# Patient Record
Sex: FEMALE | Race: WHITE | NOT HISPANIC OR LATINO | Employment: OTHER | URBAN - NONMETROPOLITAN AREA
[De-identification: names, ages, dates, MRNs, and addresses within clinical notes are randomized per-mention and may not be internally consistent; named-entity substitution may affect disease eponyms.]

---

## 2017-01-03 ENCOUNTER — HOSPITAL ENCOUNTER (OUTPATIENT)
Dept: EDUCATION SERVICES | Facility: HOSPITAL | Age: 57
Discharge: HOME | End: 2017-01-03
Attending: NURSE PRACTITIONER | Admitting: FAMILY MEDICINE
Payer: COMMERCIAL

## 2017-01-03 VITALS
HEART RATE: 63 BPM | DIASTOLIC BLOOD PRESSURE: 76 MMHG | SYSTOLIC BLOOD PRESSURE: 134 MMHG | HEIGHT: 62 IN | OXYGEN SATURATION: 97 % | WEIGHT: 241.3 LBS | BODY MASS INDEX: 44.4 KG/M2

## 2017-01-03 DIAGNOSIS — E11.65 TYPE 2 DIABETES MELLITUS WITH HYPERGLYCEMIA, WITHOUT LONG-TERM CURRENT USE OF INSULIN (H): ICD-10-CM

## 2017-01-03 DIAGNOSIS — E11.9 TYPE 2 DIABETES MELLITUS WITHOUT COMPLICATION, WITHOUT LONG-TERM CURRENT USE OF INSULIN (H): Primary | ICD-10-CM

## 2017-01-03 PROCEDURE — 97803 MED NUTRITION INDIV SUBSEQ: CPT | Performed by: DIETITIAN, REGISTERED

## 2017-01-03 ASSESSMENT — PAIN SCALES - GENERAL: PAINLEVEL: MODERATE PAIN (4)

## 2017-01-03 NOTE — PROGRESS NOTES
"Pt here for Session 4.     BG readings as follows:   [id number]-  Post meal-100, 125  Overall average-113      Blood pressure 134/76, pulse 63, height 1.575 m (5' 2\"), weight 109.453 kg (241 lb 4.8 oz), SpO2 97 %, not currently breastfeeding.  Weight is down 8.6# from last visit and down 21.3# from initial visit 9-20-16.       Current diabetes medications: none.     Readiness to learn: willing.    Barriers to learning: none.     Pt continues to do very well with meal planning efforts - even losing weight over the holidays.  She is unable to exercise r/t back issues but has still been able to lose weight.      Lipid levels have shown some improvement - Tg-181 from 306, HDL-49 from 43, LDL-157 from 161.  Pt is not taking lipid lowering medications and will see provider again in 3 months to determine if medications indicated.     Pt actively participated in the session and continues to demonstrate motivation.     Topics covered: diabetes success plan, behavior change goal review, meal planning and importance of long term compliance, developing problem solving skills, stress and how it affects blood sugar, relationship between diabetes and depression along with symptoms of depression and how they affect diabetes self-care. Rationale for consistent self-care and regular diabetes care visits, identifying medical tests/exams needed for regular diabetes care and community resources for ongoing education and support.     Pts A1c tested 11-29-16 6.3% improved from 7.1% 8-23-16.    PHQ-9 completed with score of 4.    Pt goals updated. Weight loss continues to be a goal for pt but she has not set a number goal.      Pt will continue current efforts.      Will follow annually and prn.      Total visit time: 15 minutes.     Jazmyn Loo, LORENZA, CDE      "

## 2018-09-08 DIAGNOSIS — E11.65 TYPE 2 DIABETES MELLITUS WITH HYPERGLYCEMIA, WITHOUT LONG-TERM CURRENT USE OF INSULIN (H): ICD-10-CM

## 2018-12-03 ENCOUNTER — TRANSFERRED RECORDS (OUTPATIENT)
Dept: HEALTH INFORMATION MANAGEMENT | Facility: CLINIC | Age: 58
End: 2018-12-03

## 2019-01-02 ENCOUNTER — TRANSFERRED RECORDS (OUTPATIENT)
Dept: HEALTH INFORMATION MANAGEMENT | Facility: CLINIC | Age: 59
End: 2019-01-02

## 2019-07-29 ENCOUNTER — TRANSFERRED RECORDS (OUTPATIENT)
Dept: HEALTH INFORMATION MANAGEMENT | Facility: CLINIC | Age: 59
End: 2019-07-29

## 2019-07-29 LAB
HBA1C MFR BLD: 7 % (ref 4–5.6)
TSH SERPL-ACNC: 0.47 UIU/ML (ref 0.4–3.99)

## 2019-07-31 ENCOUNTER — TRANSFERRED RECORDS (OUTPATIENT)
Dept: HEALTH INFORMATION MANAGEMENT | Facility: CLINIC | Age: 59
End: 2019-07-31

## 2019-09-06 PROBLEM — K21.9 GASTROESOPHAGEAL REFLUX DISEASE: Status: ACTIVE | Noted: 2019-09-06

## 2019-09-06 NOTE — PROGRESS NOTES
Subjective     Mary Jane Stack is a 59 year old female who presents to clinic today for the following health issues:    HPI   New Patient/Transfer of Care  Diabetes Follow-up      How often are you checking your blood sugar? Not at all    What time of day are you checking your blood sugars- not currently checking    Have you had any blood sugars above 200?  unknown    Have you had any blood sugars below 70?  unknown    What symptoms do you notice when your blood sugar is low?  None    What concerns do you have today about your diabetes? None     Do you have any of these symptoms? (Select all that apply)  No numbness or tingling in feet.  No redness, sores or blisters on feet.  No complaints of excessive thirst.  No reports of blurry vision.  No significant changes to weight.     Have you had a diabetic eye exam in the last 12 months? Yes- Date of last eye exam: 08/2019    Diabetes Management Resources    A1C: 07/29/2019: 7.0   5.9 on 04/06/2018      She is diet controlled.    Hyperlipidemia Follow-Up      Are you having any of the following symptoms? (Select all that apply)  No complaints of shortness of breath, chest pain or pressure.  No increased sweating or nausea with activity.  No left-sided neck or arm pain.  No complaints of pain in calves when walking 1-2 blocks.    Are you regularly taking any medication or supplement to lower your cholesterol?   Yes- lipitor    Are you having muscle aches or other side effects that you think could be caused by your cholesterol lowering medication?  No    Hypertension Follow-up      Do you check your blood pressure regularly outside of the clinic? No     Are you following a low salt diet? No    Are your blood pressures ever more than 140 on the top number (systolic) OR more   than 90 on the bottom number (diastolic), for example 140/90? No    BP Readings from Last 2 Encounters:   09/12/19 124/80   01/03/17 134/76     No results found for: A1C, LDL      How many servings of  fruits and vegetables do you eat daily?  2-3    On average, how many sweetened beverages do you drink each day (soda, juice, sweet tea, etc)?   0    How many days per week do you miss taking your medication? 0        Hypothyroidism Follow-up      Since last visit, patient describes the following symptoms: Weight stable, no hair loss, no skin changes, no constipation, no loose stools    TSH: 07/29/2019:  0.47          Patient Active Problem List   Diagnosis     ACP (advance care planning)     Actinic keratosis     Atypical nevus     Degeneration of intervertebral disc of lumbosacral region     Essential hypertension     Excessive and frequent menstruation     Gastroesophageal reflux disease     Hyperlipidemia     Hypothyroidism     Impaired fasting glucose     Insomnia     Low back pain     Morbid (severe) obesity due to excess calories (H)     Opioid dependence (H)     Psoriasis     Type 2 diabetes mellitus (H)     Long term current use of non-steroidal anti-inflammatories (NSAID)     Diabetes mellitus, type 2 (H)     Esophageal reflux     Hypertension     Past Surgical History:   Procedure Laterality Date     APPENDECTOMY       BACK SURGERY  2007     CARPAL TUNNEL RELEASE RT/LT  1999     DECOMPRESS DISC RF LUMBAR      3 levels     DILATION AND CURETTAGE       FUSION LUMBAR ANTERIOR THREE+ LEVELS       GALLBLADDER SURGERY  1987     HERNIA REPAIR  2007     TUBAL LIGATION  1987       Social History     Tobacco Use     Smoking status: Former Smoker     Types: Cigarettes     Smokeless tobacco: Never Used   Substance Use Topics     Alcohol use: No     Family History   Problem Relation Age of Onset     Diabetes Mother      Cancer Mother      Migraines Mother      Obesity Mother      Diabetes Maternal Grandfather      Hypertension Father      Heart Disease Father            -------------------------------------  Reviewed and updated as needed this visit by Provider         Review of Systems   ROS COMP: CONSTITUTIONAL:  NEGATIVE for fever, chills  CONSTITUTIONAL:weight gain due to inactivity   EYES: NEGATIVE for vision changes or irritation  RESP: NEGATIVE for significant cough or SOB  CV: NEGATIVE for chest pain, palpitations or peripheral edema  GI: NEGATIVE for nausea, abdominal pain, heartburn, or change in bowel habits  : NEGATIVE for frequency, dysuria, or hematuria  MUSCULOSKELETAL:POSITIVE  for arthralgias of the right knee.  She has a known torn meniscus.  She does not get any catching or clicking.  She has medial knee pain and back pain due to Lumbar DDD  NEURO: radicular pain posterior right leg  ENDOCRINE: NEGATIVE for temperature intolerance, skin/hair changes  PSYCHIATRIC: NEGATIVE for changes in mood or affect      Objective    /80 (BP Location: Right arm, Patient Position: Sitting, Cuff Size: Adult Large)   Pulse 100   Temp 98.3  F (36.8  C) (Tympanic)   Ht 1.524 m (5')   Wt 115.7 kg (255 lb)   SpO2 97%   BMI 49.80 kg/m    Body mass index is 49.8 kg/m .  Physical Exam   GENERAL: healthy, alert and no distress  EYES: Eyes grossly normal to inspection and conjunctivae and sclerae normal  NECK: no adenopathy, no asymmetry, masses, or scars and thyroid normal to palpation  NECK: no carotid bruits  RESP: lungs clear to auscultation - no rales, rhonchi or wheezes  CV: regular rate and rhythm, normal S1 S2, no S3 or S4, no murmur, click or rub, no peripheral edema and peripheral pulses strong  ABDOMEN: soft, nontender, no hepatosplenomegaly, no masses and bowel sounds normal  ABDOMEN: no bruits heard  MS: no gross musculoskeletal defects noted, no edema  NEURO: Normal strength and tone, mentation intact and speech normal  PSYCH: mentation appears normal, affect normal/bright    Diagnostic Test Results:  Labs reviewed in Epic  Results for orders placed or performed in visit on 09/12/19   Lipid Profile (Chol, Trig, HDL, LDL calc)   Result Value Ref Range    Cholesterol 179 <200 mg/dL    Triglycerides 202 (H)  <150 mg/dL    HDL Cholesterol 57 >49 mg/dL    LDL Cholesterol Calculated 82 <100 mg/dL    Non HDL Cholesterol 122 <130 mg/dL   Albumin Random Urine Quantitative with Creat Ratio   Result Value Ref Range    Creatinine Urine 515 mg/dL    Albumin Urine mg/L 115 mg/L    Albumin Urine mg/g Cr 22.33 0 - 25 mg/g Cr   T4 free   Result Value Ref Range    T4 Free 1.13 0.76 - 1.46 ng/dL   Hepatitis C antibody   Result Value Ref Range    Hepatitis C Antibody Nonreactive NR^Nonreactive                   ASSESSMENT /PLAN:    (E11.9) Type 2 diabetes mellitus without complication, without long-term current use of insulin (H)  Comment: At goal.  Her blood pressure is at goal and her renal function is normal.  She is on both an ACE inhibitor and a STATIN  Plan:   She will continue to monitor her portion sizes.    (I10) Essential hypertension  Comment: AT goal.  Plan:   She will continue Lisinopril 10 mg daily.    (E78.2) Mixed hyperlipidemia  (primary encounter diagnosis)  Comment: Her LDL is below 100  With a high HDL.  Plan:   She will continue Lipitor 40 mg for now and she may want to consider Crestor.     (E03.9) Acquired hypothyroidism  Comment: The patient's thyroid hormone levels are normal indicating a therapeutic medication level.  Plan:   She will continue Levothyroxine 50 mcg daily.    (K21.9) Gastroesophageal reflux disease, esophagitis presence not specified  Comment:   Plan:   She will continue Omeprazole 20 mg BID.    (Z11.59) Need for hepatitis C screening test  Comment: Hep C antibody is negative.  Plan:  No further work up is needed.    (Z23) Need for vaccination  Comment:   Plan:   SHINGRIX [17685], 1st  Administration  [02086]           Follow up with Provider - 2 months for diabetes with foot exam.           Anoop Brooks, , DO

## 2019-09-12 ENCOUNTER — OFFICE VISIT (OUTPATIENT)
Dept: INTERNAL MEDICINE | Facility: OTHER | Age: 59
End: 2019-09-12
Attending: INTERNAL MEDICINE
Payer: COMMERCIAL

## 2019-09-12 VITALS
WEIGHT: 255 LBS | BODY MASS INDEX: 50.06 KG/M2 | TEMPERATURE: 98.3 F | HEART RATE: 100 BPM | SYSTOLIC BLOOD PRESSURE: 124 MMHG | OXYGEN SATURATION: 97 % | DIASTOLIC BLOOD PRESSURE: 80 MMHG | HEIGHT: 60 IN

## 2019-09-12 DIAGNOSIS — Z11.59 NEED FOR HEPATITIS C SCREENING TEST: ICD-10-CM

## 2019-09-12 DIAGNOSIS — E78.2 MIXED HYPERLIPIDEMIA: Primary | ICD-10-CM

## 2019-09-12 DIAGNOSIS — K21.9 GASTROESOPHAGEAL REFLUX DISEASE, ESOPHAGITIS PRESENCE NOT SPECIFIED: ICD-10-CM

## 2019-09-12 DIAGNOSIS — E03.9 ACQUIRED HYPOTHYROIDISM: ICD-10-CM

## 2019-09-12 DIAGNOSIS — I10 ESSENTIAL HYPERTENSION: ICD-10-CM

## 2019-09-12 DIAGNOSIS — Z23 NEED FOR VACCINATION: ICD-10-CM

## 2019-09-12 DIAGNOSIS — E11.9 TYPE 2 DIABETES MELLITUS WITHOUT COMPLICATION, WITHOUT LONG-TERM CURRENT USE OF INSULIN (H): ICD-10-CM

## 2019-09-12 LAB
CHOLEST SERPL-MCNC: 179 MG/DL
CREAT UR-MCNC: 515 MG/DL
HDLC SERPL-MCNC: 57 MG/DL
LDLC SERPL CALC-MCNC: 82 MG/DL
MICROALBUMIN UR-MCNC: 115 MG/L
MICROALBUMIN/CREAT UR: 22.33 MG/G CR (ref 0–25)
NONHDLC SERPL-MCNC: 122 MG/DL
T4 FREE SERPL-MCNC: 1.13 NG/DL (ref 0.76–1.46)
TRIGL SERPL-MCNC: 202 MG/DL

## 2019-09-12 PROCEDURE — 99204 OFFICE O/P NEW MOD 45 MIN: CPT | Performed by: INTERNAL MEDICINE

## 2019-09-12 PROCEDURE — 86803 HEPATITIS C AB TEST: CPT | Mod: ZL | Performed by: INTERNAL MEDICINE

## 2019-09-12 PROCEDURE — 36415 COLL VENOUS BLD VENIPUNCTURE: CPT | Mod: ZL | Performed by: INTERNAL MEDICINE

## 2019-09-12 PROCEDURE — 82043 UR ALBUMIN QUANTITATIVE: CPT | Mod: ZL | Performed by: INTERNAL MEDICINE

## 2019-09-12 PROCEDURE — G0463 HOSPITAL OUTPT CLINIC VISIT: HCPCS

## 2019-09-12 PROCEDURE — G0463 HOSPITAL OUTPT CLINIC VISIT: HCPCS | Mod: 25

## 2019-09-12 PROCEDURE — 90471 IMMUNIZATION ADMIN: CPT | Mod: GY | Performed by: INTERNAL MEDICINE

## 2019-09-12 PROCEDURE — 84439 ASSAY OF FREE THYROXINE: CPT | Mod: ZL | Performed by: INTERNAL MEDICINE

## 2019-09-12 PROCEDURE — 90750 HZV VACC RECOMBINANT IM: CPT

## 2019-09-12 PROCEDURE — 80061 LIPID PANEL: CPT | Mod: ZL | Performed by: INTERNAL MEDICINE

## 2019-09-12 RX ORDER — ATORVASTATIN CALCIUM 40 MG/1
TABLET, FILM COATED ORAL
COMMUNITY
Start: 2019-06-28

## 2019-09-12 RX ORDER — LISINOPRIL 10 MG/1
TABLET ORAL
COMMUNITY
Start: 2019-08-16

## 2019-09-12 RX ORDER — CELECOXIB 200 MG/1
200 CAPSULE ORAL 2 TIMES DAILY
Refills: 0 | COMMUNITY
Start: 2019-08-23

## 2019-09-12 RX ORDER — LEVOTHYROXINE SODIUM 50 UG/1
TABLET ORAL
COMMUNITY
Start: 2019-08-20 | End: 2019-09-12 | Stop reason: DRUGHIGH

## 2019-09-12 RX ORDER — LEVOTHYROXINE SODIUM 50 UG/1
50 TABLET ORAL DAILY
Qty: 90 TABLET | Refills: 1 | COMMUNITY
Start: 2019-09-12

## 2019-09-12 ASSESSMENT — PAIN SCALES - GENERAL: PAINLEVEL: MODERATE PAIN (4)

## 2019-09-12 ASSESSMENT — ANXIETY QUESTIONNAIRES
2. NOT BEING ABLE TO STOP OR CONTROL WORRYING: NOT AT ALL
4. TROUBLE RELAXING: NOT AT ALL
GAD7 TOTAL SCORE: 1
5. BEING SO RESTLESS THAT IT IS HARD TO SIT STILL: NOT AT ALL
6. BECOMING EASILY ANNOYED OR IRRITABLE: SEVERAL DAYS
7. FEELING AFRAID AS IF SOMETHING AWFUL MIGHT HAPPEN: NOT AT ALL
1. FEELING NERVOUS, ANXIOUS, OR ON EDGE: NOT AT ALL
3. WORRYING TOO MUCH ABOUT DIFFERENT THINGS: NOT AT ALL

## 2019-09-12 ASSESSMENT — PATIENT HEALTH QUESTIONNAIRE - PHQ9: SUM OF ALL RESPONSES TO PHQ QUESTIONS 1-9: 4

## 2019-09-12 ASSESSMENT — MIFFLIN-ST. JEOR: SCORE: 1658.17

## 2019-09-12 NOTE — NURSING NOTE
Chief Complaint   Patient presents with     Establish Care       Initial /80 (BP Location: Right arm, Patient Position: Sitting, Cuff Size: Adult Large)   Pulse 100   Temp 98.3  F (36.8  C) (Tympanic)   Ht 1.524 m (5')   Wt 115.7 kg (255 lb)   SpO2 97%   BMI 49.80 kg/m   Estimated body mass index is 49.8 kg/m  as calculated from the following:    Height as of this encounter: 1.524 m (5').    Weight as of this encounter: 115.7 kg (255 lb).  Medication Reconciliation: complete   Piedad Mckay LPN

## 2019-09-13 ASSESSMENT — ANXIETY QUESTIONNAIRES: GAD7 TOTAL SCORE: 1

## 2019-09-16 LAB — HCV AB SERPL QL IA: NONREACTIVE

## 2019-09-27 ENCOUNTER — TRANSFERRED RECORDS (OUTPATIENT)
Dept: HEALTH INFORMATION MANAGEMENT | Facility: CLINIC | Age: 59
End: 2019-09-27

## 2020-10-05 DIAGNOSIS — E11.9 TYPE 2 DIABETES MELLITUS WITHOUT COMPLICATION, WITHOUT LONG-TERM CURRENT USE OF INSULIN (H): Primary | ICD-10-CM

## 2020-10-06 RX ORDER — BLOOD SUGAR DIAGNOSTIC
STRIP MISCELLANEOUS
Qty: 100 STRIP | Refills: 11 | Status: SHIPPED | OUTPATIENT
Start: 2020-10-06

## 2020-10-06 NOTE — TELEPHONE ENCOUNTER
BG one touch ultra test strips       Last Written Prescription Date:  9/12/19  Last Fill Quantity: 100,   # refills: 11  Last Office Visit: 9/12/19  Future Office visit:       Routing refill request to provider for review/approval because:

## 2021-05-28 ENCOUNTER — HOSPITAL ENCOUNTER (EMERGENCY)
Facility: HOSPITAL | Age: 61
Discharge: HOME | End: 2021-05-28
Attending: EMERGENCY MEDICINE | Admitting: EMERGENCY MEDICINE
Payer: COMMERCIAL

## 2021-05-28 VITALS
SYSTOLIC BLOOD PRESSURE: 132 MMHG | OXYGEN SATURATION: 97 % | TEMPERATURE: 97.4 F | DIASTOLIC BLOOD PRESSURE: 88 MMHG | HEART RATE: 64 BPM | RESPIRATION RATE: 11 BRPM

## 2021-05-28 DIAGNOSIS — R73.9 HYPERGLYCEMIA: ICD-10-CM

## 2021-05-28 LAB
ALBUMIN UR-MCNC: NEGATIVE MG/DL
ANION GAP SERPL CALCULATED.3IONS-SCNC: 7 MMOL/L (ref 3–14)
APPEARANCE UR: CLEAR
BASE EXCESS BLDV CALC-SCNC: 1.7 MMOL/L
BASOPHILS # BLD AUTO: 0.1 10E9/L (ref 0–0.2)
BASOPHILS NFR BLD AUTO: 0.8 %
BILIRUB UR QL STRIP: NEGATIVE
BUN SERPL-MCNC: 12 MG/DL (ref 7–30)
CALCIUM SERPL-MCNC: 9.4 MG/DL (ref 8.5–10.1)
CHLORIDE SERPL-SCNC: 102 MMOL/L (ref 94–109)
CO2 SERPL-SCNC: 28 MMOL/L (ref 20–32)
COLOR UR AUTO: YELLOW
CREAT SERPL-MCNC: 0.6 MG/DL (ref 0.52–1.04)
DIFFERENTIAL METHOD BLD: ABNORMAL
EOSINOPHIL # BLD AUTO: 0.2 10E9/L (ref 0–0.7)
EOSINOPHIL NFR BLD AUTO: 3.2 %
ERYTHROCYTE [DISTWIDTH] IN BLOOD BY AUTOMATED COUNT: 11.9 % (ref 10–15)
EST. AVERAGE GLUCOSE BLD GHB EST-MCNC: 249 MG/DL
GFR SERPL CREATININE-BSD FRML MDRD: >90 ML/MIN/{1.73_M2}
GLUCOSE BLDC GLUCOMTR-MCNC: 300 MG/DL (ref 70–99)
GLUCOSE BLDC GLUCOMTR-MCNC: 334 MG/DL (ref 70–99)
GLUCOSE SERPL-MCNC: 328 MG/DL (ref 70–99)
GLUCOSE UR STRIP-MCNC: >1000 MG/DL
HBA1C MFR BLD: 10.3 % (ref 0–5.6)
HCO3 BLDV-SCNC: 28 MMOL/L (ref 21–28)
HCT VFR BLD AUTO: 47.4 % (ref 35–47)
HGB BLD-MCNC: 16.6 G/DL (ref 11.7–15.7)
HGB UR QL STRIP: NEGATIVE
IMM GRANULOCYTES # BLD: 0 10E9/L (ref 0–0.4)
IMM GRANULOCYTES NFR BLD: 0.3 %
KETONES BLD-SCNC: 0.3 MMOL/L (ref 0–0.6)
KETONES UR STRIP-MCNC: NEGATIVE MG/DL
LEUKOCYTE ESTERASE UR QL STRIP: NEGATIVE
LYMPHOCYTES # BLD AUTO: 1.8 10E9/L (ref 0.8–5.3)
LYMPHOCYTES NFR BLD AUTO: 25.7 %
MAGNESIUM SERPL-MCNC: 1.7 MG/DL (ref 1.6–2.3)
MCH RBC QN AUTO: 29.2 PG (ref 26.5–33)
MCHC RBC AUTO-ENTMCNC: 35 G/DL (ref 31.5–36.5)
MCV RBC AUTO: 83 FL (ref 78–100)
MONOCYTES # BLD AUTO: 0.4 10E9/L (ref 0–1.3)
MONOCYTES NFR BLD AUTO: 5.9 %
NEUTROPHILS # BLD AUTO: 4.5 10E9/L (ref 1.6–8.3)
NEUTROPHILS NFR BLD AUTO: 64.1 %
NITRATE UR QL: NEGATIVE
NRBC # BLD AUTO: 0 10*3/UL
NRBC BLD AUTO-RTO: 0 /100
O2/TOTAL GAS SETTING VFR VENT: 97 %
PCO2 BLDV: 51 MM HG (ref 40–50)
PH BLDV: 7.36 PH (ref 7.32–7.43)
PH UR STRIP: 5 PH (ref 4.7–8)
PHOSPHATE SERPL-MCNC: 3.4 MG/DL (ref 2.5–4.5)
PLATELET # BLD AUTO: 303 10E9/L (ref 150–450)
PO2 BLDV: 29 MM HG (ref 25–47)
POTASSIUM SERPL-SCNC: 3.7 MMOL/L (ref 3.4–5.3)
RBC # BLD AUTO: 5.69 10E12/L (ref 3.8–5.2)
SODIUM SERPL-SCNC: 137 MMOL/L (ref 133–144)
SOURCE: ABNORMAL
SP GR UR STRIP: 1.03 (ref 1–1.03)
UROBILINOGEN UR STRIP-MCNC: NORMAL MG/DL (ref 0–2)
WBC # BLD AUTO: 7.1 10E9/L (ref 4–11)

## 2021-05-28 PROCEDURE — 36415 COLL VENOUS BLD VENIPUNCTURE: CPT

## 2021-05-28 PROCEDURE — 84100 ASSAY OF PHOSPHORUS: CPT | Performed by: EMERGENCY MEDICINE

## 2021-05-28 PROCEDURE — 83036 HEMOGLOBIN GLYCOSYLATED A1C: CPT | Performed by: EMERGENCY MEDICINE

## 2021-05-28 PROCEDURE — 99284 EMERGENCY DEPT VISIT MOD MDM: CPT | Mod: 25

## 2021-05-28 PROCEDURE — 80048 BASIC METABOLIC PNL TOTAL CA: CPT | Performed by: EMERGENCY MEDICINE

## 2021-05-28 PROCEDURE — 82803 BLOOD GASES ANY COMBINATION: CPT | Performed by: EMERGENCY MEDICINE

## 2021-05-28 PROCEDURE — 258N000002 HC RX IP 258 OP 250: Performed by: EMERGENCY MEDICINE

## 2021-05-28 PROCEDURE — 999N001182 HC STATISTIC ESTIMATED AVERAGE GLUCOSE: Performed by: EMERGENCY MEDICINE

## 2021-05-28 PROCEDURE — 250N000012 HC RX MED GY IP 250 OP 636 PS 637: Performed by: EMERGENCY MEDICINE

## 2021-05-28 PROCEDURE — 82010 KETONE BODYS QUAN: CPT | Performed by: EMERGENCY MEDICINE

## 2021-05-28 PROCEDURE — 96374 THER/PROPH/DIAG INJ IV PUSH: CPT

## 2021-05-28 PROCEDURE — 85025 COMPLETE CBC W/AUTO DIFF WBC: CPT | Performed by: EMERGENCY MEDICINE

## 2021-05-28 PROCEDURE — 99284 EMERGENCY DEPT VISIT MOD MDM: CPT | Performed by: EMERGENCY MEDICINE

## 2021-05-28 PROCEDURE — 999N001017 HC STATISTIC GLUCOSE BY METER IP

## 2021-05-28 PROCEDURE — 258N000003 HC RX IP 258 OP 636: Performed by: EMERGENCY MEDICINE

## 2021-05-28 PROCEDURE — 83735 ASSAY OF MAGNESIUM: CPT | Performed by: EMERGENCY MEDICINE

## 2021-05-28 PROCEDURE — 96361 HYDRATE IV INFUSION ADD-ON: CPT

## 2021-05-28 PROCEDURE — 81003 URINALYSIS AUTO W/O SCOPE: CPT | Performed by: EMERGENCY MEDICINE

## 2021-05-28 RX ORDER — OXYCODONE AND ACETAMINOPHEN 10; 325 MG/1; MG/1
1 TABLET ORAL EVERY 6 HOURS PRN
COMMUNITY

## 2021-05-28 RX ORDER — SODIUM CHLORIDE 450 MG/100ML
1000 INJECTION, SOLUTION INTRAVENOUS CONTINUOUS
Status: DISCONTINUED | OUTPATIENT
Start: 2021-05-28 | End: 2021-05-28 | Stop reason: HOSPADM

## 2021-05-28 RX ADMIN — SODIUM CHLORIDE 1000 ML: 4.5 INJECTION, SOLUTION INTRAVENOUS at 17:28

## 2021-05-28 RX ADMIN — INSULIN HUMAN 6 UNITS: 100 INJECTION, SOLUTION PARENTERAL at 16:35

## 2021-05-28 RX ADMIN — SODIUM CHLORIDE 1000 ML: 9 INJECTION, SOLUTION INTRAVENOUS at 16:35

## 2021-05-28 ASSESSMENT — ENCOUNTER SYMPTOMS
CARDIOVASCULAR NEGATIVE: 1
GASTROINTESTINAL NEGATIVE: 1
CONSTITUTIONAL NEGATIVE: 1
POLYDIPSIA: 0
RESPIRATORY NEGATIVE: 1
EYES NEGATIVE: 1
NEUROLOGICAL NEGATIVE: 1
MUSCULOSKELETAL NEGATIVE: 1

## 2021-05-28 NOTE — ED NOTES
"Patient ambulated to room. Patient reports feeling \"off\" and that she checked her glucose last night and today and it was in the mid 300 range. Patient stated she was unsure of a number point in which she should be evaluated at. Patient reports increased thirst and urination for a while now. Patient denies any pain. Patient stated she has not needed any medication at this point to manage her glucose levels. Patient denies any additional needs at this time.   "

## 2021-05-28 NOTE — ED PROVIDER NOTES
"  History     Chief Complaint   Patient presents with     Hyperglycemia     HPI  Mary Jane Stack is a 61 year old female who comes to the emergency department complaining of elevated blood sugar.  She states that she is a diet-controlled diabetic.  She states she is not checked her sugar in quite some time but when she checked it today was elevated.  She states that she feels \"not quite right\".  She was recently treated for a yeast infection.  She wonders if she may not have a urinary tract infection.  She is not noticed hematuria or dysuria.  She denies a headache or visual disturbance.  She states is not having pain in her chest currently.  She denies feeling short of breath.  She has had no nausea or vomiting.  She has not had diarrhea or constipation.  She denies fevers or chills.    Allergies:  Allergies   Allergen Reactions     Vancomycin Shortness Of Breath     Penicillin G Rash       Problem List:    Patient Active Problem List    Diagnosis Date Noted     Diabetes mellitus, type 2 (H)      Priority: Medium     Esophageal reflux      Priority: Medium     Hypertension      Priority: Medium     Gastroesophageal reflux disease 09/06/2019     Priority: Medium     Overview:   IMO Update 10/11       ACP (advance care planning) 09/20/2016     Priority: Medium     Advance Care Planning 9/20/2016: ACP Review of Chart / Resources Provided:  Reviewed chart for advance care plan.  Mary Jane Stack has no plan or code status on file. Discussed available resources and provided with information. Confirmed code status reflects current choices pending further ACP discussions.  Confirmed/documented legally designated decision makers.  Added by Alyssa Brewster           Type 2 diabetes mellitus (H) 08/23/2016     Priority: Medium     Opioid dependence (H) 03/25/2016     Priority: Medium     Hyperlipidemia 04/01/2015     Priority: Medium     Morbid (severe) obesity due to excess calories (H) 04/10/2012     Priority: Medium     Long " term current use of non-steroidal anti-inflammatories (NSAID) 03/20/2012     Priority: Medium     Actinic keratosis 03/06/2012     Priority: Medium     Atypical nevus 03/06/2012     Priority: Medium     Overview:   IMO Update 10/11  IMO Update       Hypothyroidism 07/25/2011     Priority: Medium     Overview:   IMO Update 10/11       Impaired fasting glucose 01/20/2011     Priority: Medium     Overview:   IMO Update 10/11       Insomnia 12/06/2006     Priority: Medium     Degeneration of intervertebral disc of lumbosacral region 10/10/2006     Priority: Medium     Overview:   IMO Update 10/11       Excessive and frequent menstruation 10/10/2006     Priority: Medium     Overview:   IMO Update 10/11       Low back pain 10/10/2006     Priority: Medium     Overview:   IMO Update 10/11       Psoriasis 10/10/2006     Priority: Medium     Essential hypertension 09/12/2006     Priority: Medium     Overview:   IMO Update          Past Medical History:    Past Medical History:   Diagnosis Date     Diabetes mellitus, type 2 (H)      Esophageal reflux      Gall bladder disease      Hypertension      Migraines      Thyroid disease      Uncomplicated asthma        Past Surgical History:    Past Surgical History:   Procedure Laterality Date     APPENDECTOMY       BACK SURGERY  2007     CARPAL TUNNEL RELEASE RT/LT  1999     COLONOSCOPY - HIM SCAN  01/27/2005    Diverticulosis and hemorrhoids, family history, repeat 5 yrs     DECOMPRESS DISC RF LUMBAR      3 levels     DILATION AND CURETTAGE       FUSION LUMBAR ANTERIOR THREE+ LEVELS       GALLBLADDER SURGERY  1987     HERNIA REPAIR  2007     TUBAL LIGATION  1987       Family History:    Family History   Problem Relation Age of Onset     Diabetes Mother      Cancer Mother      Migraines Mother      Obesity Mother      Diabetes Maternal Grandfather      Hypertension Father      Heart Disease Father        Social History:  Marital Status:   [4]  Social History     Tobacco Use      Smoking status: Former Smoker     Types: Cigarettes     Smokeless tobacco: Never Used   Substance Use Topics     Alcohol use: No     Drug use: No        Medications:    ACETAMINOPHEN PO  ASPIRIN PO  atorvastatin (LIPITOR) 40 MG tablet  celecoxib (CELEBREX) 200 MG capsule  levothyroxine (SYNTHROID/LEVOTHROID) 50 MCG tablet  lisinopril (PRINIVIL/ZESTRIL) 10 MG tablet  metFORMIN (GLUCOPHAGE) 500 MG tablet  omeprazole (PRILOSEC) 2 mg/mL  oxyCODONE-acetaminophen (PERCOCET)  MG per tablet  Tolterodine Tartrate (DETROL LA PO)  vitamin  B complex with vitamin C (VITAMIN  B COMPLEX) TABS  albuterol (PROAIR HFA, PROVENTIL HFA, VENTOLIN HFA) 108 (90 BASE) MCG/ACT inhaler  blood glucose monitoring (ONE TOUCH DELICA) lancets  Eszopiclone (LUNESTA PO)  ONETOUCH ULTRA test strip  OxyCODONE HCl (OXYCONTIN PO)          Review of Systems   Constitutional: Negative.    HENT: Negative.    Eyes: Negative.    Respiratory: Negative.    Cardiovascular: Negative.    Gastrointestinal: Negative.    Endocrine: Negative for polydipsia and polyuria.   Genitourinary: Negative.    Musculoskeletal: Negative.    Neurological: Negative.    All other systems reviewed and are found unremarkable    Physical Exam   BP: (!) 148/101  Pulse: 83  Temp: 97.4  F (36.3  C)  Resp: 18  SpO2: 99 %      Physical Exam this is a 61-year-old female who is awake alert oriented person place and time.  She is very pleasant and cooperative with my exam.  She does not appear in acute distress at this time.  HEENT normocephalic extraocular muscles intact pupils equally round and reactive to light oropharynx is unremarkable.  Neck is supple his range of motion without pain or evidence of nuchal irritation.  Lungs are clear bilaterally.  Heart maintains a regular rate and rhythm S1 and S2 sounds are appreciated.  The abdomen is soft and nontender no mass no organomegaly no rebound.  Extremities have full range of motion 5/5 strength no edema.  Neurologic exam no  focal cranial nerve deficit is appreciated.  Dermatologic exam there are no diffuse skin rashes or lesions.    ED Course      The patient remained very stable throughout her stay in the emergency department.  She tolerated IV hydration and regular insulin administration quite well.  Her blood sugar began to come down.  I discussed the need for starting an oral hypoglycemic.  Patient is agreeable.  She will be discharged with appropriate discharge instructions.  I will advise her to be reassessed by her primary care provider soon as possible.  I will also urged her to return immediately to the emergency department if further problems should occur.                     Results for orders placed or performed during the hospital encounter of 05/28/21 (from the past 24 hour(s))   Glucose by meter   Result Value Ref Range    Glucose 334 (H) 70 - 99 mg/dL   Hemoglobin A1c   Result Value Ref Range    Hemoglobin A1C 10.3 (H) 0 - 5.6 %   CBC with platelets differential   Result Value Ref Range    WBC 7.1 4.0 - 11.0 10e9/L    RBC Count 5.69 (H) 3.8 - 5.2 10e12/L    Hemoglobin 16.6 (H) 11.7 - 15.7 g/dL    Hematocrit 47.4 (H) 35.0 - 47.0 %    MCV 83 78 - 100 fl    MCH 29.2 26.5 - 33.0 pg    MCHC 35.0 31.5 - 36.5 g/dL    RDW 11.9 10.0 - 15.0 %    Platelet Count 303 150 - 450 10e9/L    Diff Method Automated Method     % Neutrophils 64.1 %    % Lymphocytes 25.7 %    % Monocytes 5.9 %    % Eosinophils 3.2 %    % Basophils 0.8 %    % Immature Granulocytes 0.3 %    Nucleated RBCs 0 0 /100    Absolute Neutrophil 4.5 1.6 - 8.3 10e9/L    Absolute Lymphocytes 1.8 0.8 - 5.3 10e9/L    Absolute Monocytes 0.4 0.0 - 1.3 10e9/L    Absolute Eosinophils 0.2 0.0 - 0.7 10e9/L    Absolute Basophils 0.1 0.0 - 0.2 10e9/L    Abs Immature Granulocytes 0.0 0 - 0.4 10e9/L    Absolute Nucleated RBC 0.0    Basic metabolic panel   Result Value Ref Range    Sodium 137 133 - 144 mmol/L    Potassium 3.7 3.4 - 5.3 mmol/L    Chloride 102 94 - 109 mmol/L    Carbon  Dioxide 28 20 - 32 mmol/L    Anion Gap 7 3 - 14 mmol/L    Glucose 328 (H) 70 - 99 mg/dL    Urea Nitrogen 12 7 - 30 mg/dL    Creatinine 0.60 0.52 - 1.04 mg/dL    GFR Estimate >90 >60 mL/min/[1.73_m2]    GFR Estimate If Black >90 >60 mL/min/[1.73_m2]    Calcium 9.4 8.5 - 10.1 mg/dL   Phosphorus   Result Value Ref Range    Phosphorus 3.4 2.5 - 4.5 mg/dL   Magnesium   Result Value Ref Range    Magnesium 1.7 1.6 - 2.3 mg/dL   Blood gas venous   Result Value Ref Range    Ph Venous 7.36 7.32 - 7.43 pH    PCO2 Venous 51 (H) 40 - 50 mm Hg    PO2 Venous 29 25 - 47 mm Hg    Bicarbonate Venous 28 21 - 28 mmol/L    Base Excess Venous 1.7 mmol/L    FIO2 97    Ketone Beta-Hydroxybutyrate Quantitative   Result Value Ref Range    Ketone Quantitative 0.3 0.0 - 0.6 mmol/L   Estimated Average Glucose   Result Value Ref Range    Estimated Average Glucose 249 mg/dL   Glucose by meter   Result Value Ref Range    Glucose 300 (H) 70 - 99 mg/dL   UA reflex to Microscopic   Result Value Ref Range    Color Urine Yellow     Appearance Urine Clear     Glucose Urine >1000 (A) NEG^Negative mg/dL    Bilirubin Urine Negative NEG^Negative    Ketones Urine Negative NEG^Negative mg/dL    Specific Gravity Urine 1.033 1.003 - 1.035    Blood Urine Negative NEG^Negative    pH Urine 5.0 4.7 - 8.0 pH    Protein Albumin Urine Negative NEG^Negative mg/dL    Urobilinogen mg/dL Normal 0.0 - 2.0 mg/dL    Nitrite Urine Negative NEG^Negative    Leukocyte Esterase Urine Negative NEG^Negative    Source Midstream Urine        Medications   0.9% sodium chloride BOLUS (0 mLs Intravenous Stopped 5/28/21 1727)     Followed by   0.45% sodium chloride infusion (1,000 mLs Intravenous New Bag 5/28/21 1728)   insulin (regular) (HumuLIN R/NovoLIN R) injection 6 Units (6 Units Intravenous Given 5/28/21 1635)       Assessments & Plan (with Medical Decision Making)     I have reviewed the nursing notes.    I have reviewed the findings, diagnosis, plan and need for follow up with  the patient.  Plan for this patient will be discharged with appropriate prescriptions discharge instructions and instructions for follow-up.    New Prescriptions    METFORMIN (GLUCOPHAGE) 500 MG TABLET    Take 1 tablet (500 mg) by mouth 2 times daily (with meals)       Final diagnoses:   Hyperglycemia     History of diet-controlled diabetes.  5/28/2021   [facility]     Devan Flowers DO  05/28/21 1552

## 2021-05-28 NOTE — ED TRIAGE NOTES
Patient presemnts with complaints of higher blood sugars. States she is diabetic, but has never been on anything to help lower her sugars other then diet. She states she has not checked her sugars in quite some time and states previously she was in there 100's.

## 2021-06-22 ENCOUNTER — MEDICAL CORRESPONDENCE (OUTPATIENT)
Dept: HEALTH INFORMATION MANAGEMENT | Facility: CLINIC | Age: 61
End: 2021-06-22

## 2021-07-16 ENCOUNTER — TELEPHONE (OUTPATIENT)
Dept: SURGERY | Facility: OTHER | Age: 61
End: 2021-07-16

## 2021-07-16 NOTE — TELEPHONE ENCOUNTER
Referral received for colonoscopy for screening for colon cancer.   This patient was not approved by [facility] surgery education RN for meet and greet colonoscopy without preop appointment.   First attempt to call patient to schedule. No answer. Leona Angela LPN

## 2021-08-13 ENCOUNTER — TELEPHONE (OUTPATIENT)
Dept: SURGERY | Facility: OTHER | Age: 61
End: 2021-08-13

## 2021-08-13 NOTE — TELEPHONE ENCOUNTER
Referral received for colonoscopy for screening for colon cancer.   This patient was not approved by [facility], surgery education RN for meet and greet without preop.   This is the 2nd attempt by phone to schedule meet and greet colonoscopy. No answer.   Letter sent requesting patient to call office to schedule colonoscopy/consult.  Leona Angela LPN  .

## 2021-10-26 ENCOUNTER — MEDICAL CORRESPONDENCE (OUTPATIENT)
Dept: ULTRASOUND IMAGING | Facility: HOSPITAL | Age: 61
End: 2021-10-26

## 2023-10-20 ENCOUNTER — HOSPITAL ENCOUNTER (EMERGENCY)
Facility: HOSPITAL | Age: 63
Discharge: HOME | End: 2023-10-20
Attending: NURSE PRACTITIONER | Admitting: NURSE PRACTITIONER
Payer: COMMERCIAL

## 2023-10-20 VITALS
RESPIRATION RATE: 16 BRPM | TEMPERATURE: 97.6 F | HEIGHT: 62 IN | SYSTOLIC BLOOD PRESSURE: 127 MMHG | DIASTOLIC BLOOD PRESSURE: 86 MMHG | HEART RATE: 82 BPM | OXYGEN SATURATION: 96 % | BODY MASS INDEX: 40.72 KG/M2 | WEIGHT: 221.3 LBS

## 2023-10-20 DIAGNOSIS — H61.21 IMPACTED CERUMEN OF RIGHT EAR: Primary | ICD-10-CM

## 2023-10-20 DIAGNOSIS — H66.91 ACUTE RIGHT OTITIS MEDIA: ICD-10-CM

## 2023-10-20 PROCEDURE — G0463 HOSPITAL OUTPT CLINIC VISIT: HCPCS

## 2023-10-20 PROCEDURE — 99213 OFFICE O/P EST LOW 20 MIN: CPT | Performed by: NURSE PRACTITIONER

## 2023-10-20 RX ORDER — CEFDINIR 300 MG/1
300 CAPSULE ORAL 2 TIMES DAILY
Qty: 20 CAPSULE | Refills: 0 | Status: SHIPPED | OUTPATIENT
Start: 2023-10-20 | End: 2023-10-30

## 2023-10-20 ASSESSMENT — ENCOUNTER SYMPTOMS
SORE THROAT: 0
DIARRHEA: 0
FEVER: 0
NAUSEA: 0
EYE DISCHARGE: 0
EYE REDNESS: 0
COUGH: 0
CHILLS: 0
VOMITING: 0
PSYCHIATRIC NEGATIVE: 1
SHORTNESS OF BREATH: 0
RHINORRHEA: 0

## 2023-10-20 NOTE — ED PROVIDER NOTES
History     Chief Complaint   Patient presents with    Otalgia     HPI  Mary Jane Stack is a 63 year old female who presents to urgent care today (ambulatory) with complaints of right ear pain and hearing loss which started a week ago.  Denies any ear drainage.  Denies any tinnitus.  History of otitis media as an adult.  Denies any fever, chills, nausea, vomiting, diarrhea, shortness of breath or chest pain.  Denies any rhinorrhea, congestion, sore throat or cough.  No other concerns.    Allergies:  Allergies   Allergen Reactions    Vancomycin Shortness Of Breath    Penicillin G Rash       Problem List:    Patient Active Problem List    Diagnosis Date Noted    Diabetes mellitus, type 2 (H)      Priority: Medium    Esophageal reflux      Priority: Medium    Hypertension      Priority: Medium    Gastroesophageal reflux disease 09/06/2019     Priority: Medium     Overview:   IMO Update 10/11      ACP (advance care planning) 09/20/2016     Priority: Medium     Advance Care Planning 9/20/2016: ACP Review of Chart / Resources Provided:  Reviewed chart for advance care plan.  Mary Jane Stack has no plan or code status on file. Discussed available resources and provided with information. Confirmed code status reflects current choices pending further ACP discussions.  Confirmed/documented legally designated decision makers.  Added by Alyssa Brewster          Type 2 diabetes mellitus (H) 08/23/2016     Priority: Medium    Opioid dependence (H) 03/25/2016     Priority: Medium    Hyperlipidemia 04/01/2015     Priority: Medium    Morbid (severe) obesity due to excess calories (H) 04/10/2012     Priority: Medium    Long term current use of non-steroidal anti-inflammatories (NSAID) 03/20/2012     Priority: Medium    Actinic keratosis 03/06/2012     Priority: Medium    Atypical nevus 03/06/2012     Priority: Medium     Overview:   IMO Update 10/11  IMO Update      Hypothyroidism 07/25/2011     Priority: Medium     Overview:   IMO  Update 10/11      Impaired fasting glucose 01/20/2011     Priority: Medium     Overview:   IMO Update 10/11      Insomnia 12/06/2006     Priority: Medium    Degeneration of intervertebral disc of lumbosacral region 10/10/2006     Priority: Medium     Overview:   IMO Update 10/11      Excessive and frequent menstruation 10/10/2006     Priority: Medium     Overview:   IMO Update 10/11      Low back pain 10/10/2006     Priority: Medium     Overview:   IMO Update 10/11      Psoriasis 10/10/2006     Priority: Medium    Essential hypertension 09/12/2006     Priority: Medium     Overview:   IMO Update          Past Medical History:    Past Medical History:   Diagnosis Date    Diabetes mellitus, type 2 (H)     Esophageal reflux     Gall bladder disease     Hypertension     Migraines     Thyroid disease     Uncomplicated asthma        Past Surgical History:    Past Surgical History:   Procedure Laterality Date    APPENDECTOMY      BACK SURGERY  2007    CARPAL TUNNEL RELEASE RT/LT  1999    COLONOSCOPY - HIM SCAN  01/27/2005    Diverticulosis and hemorrhoids, family history, repeat 5 yrs    DECOMPRESS DISC RF LUMBAR      3 levels    DILATION AND CURETTAGE      FUSION LUMBAR ANTERIOR THREE+ LEVELS      GALLBLADDER SURGERY  1987    HERNIA REPAIR  2007    TUBAL LIGATION  1987       Family History:    Family History   Problem Relation Age of Onset    Diabetes Mother     Cancer Mother     Migraines Mother     Obesity Mother     Diabetes Maternal Grandfather     Hypertension Father     Heart Disease Father        Social History:  Marital Status:   [4]  Social History     Tobacco Use    Smoking status: Former     Types: Cigarettes    Smokeless tobacco: Never   Substance Use Topics    Alcohol use: No    Drug use: No        Medications:    albuterol (PROAIR HFA, PROVENTIL HFA, VENTOLIN HFA) 108 (90 BASE) MCG/ACT inhaler  ASPIRIN PO  atorvastatin (LIPITOR) 40 MG tablet  blood glucose monitoring (ONE TOUCH DELICA)  "lancets  cefdinir (OMNICEF) 300 MG capsule  celecoxib (CELEBREX) 200 MG capsule  Eszopiclone (LUNESTA PO)  levothyroxine (SYNTHROID/LEVOTHROID) 50 MCG tablet  lisinopril (PRINIVIL/ZESTRIL) 10 MG tablet  metFORMIN (GLUCOPHAGE) 500 MG tablet  omeprazole (PRILOSEC) 2 mg/mL  ONETOUCH ULTRA test strip  OxyCODONE HCl (OXYCONTIN PO)  oxyCODONE-acetaminophen (PERCOCET)  MG per tablet  Tolterodine Tartrate (DETROL LA PO)  vitamin  B complex with vitamin C (VITAMIN  B COMPLEX) TABS  ACETAMINOPHEN PO      Review of Systems   Constitutional:  Negative for chills and fever.   HENT:  Positive for ear pain (right) and hearing loss. Negative for congestion, ear discharge, rhinorrhea, sore throat and tinnitus.    Eyes:  Negative for discharge and redness.   Respiratory:  Negative for cough and shortness of breath.    Cardiovascular:  Negative for chest pain.   Gastrointestinal:  Negative for diarrhea, nausea and vomiting.   Musculoskeletal:  Negative for gait problem.   Skin:  Negative for rash.   Psychiatric/Behavioral: Negative.       Physical Exam   BP: 127/86  Pulse: 82  Temp: 97.6  F (36.4  C)  Resp: 16  Height: 157.5 cm (5' 2\")  Weight: 100.4 kg (221 lb 4.8 oz)  SpO2: 96 %    Physical Exam  Vitals and nursing note reviewed.   Constitutional:       General: She is not in acute distress.     Appearance: Normal appearance. She is not ill-appearing or toxic-appearing.   HENT:      Right Ear: Ear canal and external ear normal. There is impacted cerumen. Tympanic membrane is erythematous and bulging. Tympanic membrane is not perforated.      Left Ear: Tympanic membrane, ear canal and external ear normal.      Nose: Nose normal.      Mouth/Throat:      Mouth: Mucous membranes are moist.      Pharynx: Oropharynx is clear.   Cardiovascular:      Rate and Rhythm: Normal rate and regular rhythm.      Pulses: Normal pulses.      Heart sounds: Normal heart sounds.   Pulmonary:      Effort: Pulmonary effort is normal.      Breath " sounds: Normal breath sounds.   Skin:     General: Skin is warm and dry.   Neurological:      Mental Status: She is alert.   Psychiatric:         Mood and Affect: Mood normal.       ED Course     No results found for this or any previous visit (from the past 24 hour(s)).    Medications - No data to display    Assessments & Plan (with Medical Decision Making)     I have reviewed the nursing notes.    I have reviewed the findings, diagnosis, plan and need for follow up with the patient.  (H61.21) Impacted cerumen of right ear  (primary encounter diagnosis)  (H66.91) Acute right otitis media  Plan:   Patient ambulatory with a nontoxic appearance.  Patient arrived with complaints of right ear pain and hearing loss.  Patient had impacted cerumen, ear flushed and cerumen removed, hearing returned.  Patient continues to have right ear pain, TM erythematous and bulging, will treat for acute otitis media with cefdinir.  Patient has history of otitis media as an adult.  No perforated TM.  No tinnitus.  No dizziness.  Patient to take cefdinir as ordered.  Alternate Tylenol and ibuprofen as needed for pain.  Follow-up with primary care provider or return to urgent care/ED with any worsening in condition or additional concerns.  Patient in agreement with treatment plan.    New Prescriptions    CEFDINIR (OMNICEF) 300 MG CAPSULE    Take 1 capsule (300 mg) by mouth 2 times daily for 10 days     Final diagnoses:   Impacted cerumen of right ear   Acute right otitis media     10/20/2023   [facility]       Marily Su NP  10/20/23 1907

## 2023-10-20 NOTE — DISCHARGE INSTRUCTIONS
Cefdinir as ordered  - Take entire course of antibiotic even if you start to feel better.  - Antibiotics can cause stomach upset including nausea and diarrhea. Read your bottle or ask the pharmacist if antibiotic can be taken with food to help prevent nausea. If you have symptoms of diarrhea you can take an over-the-counter probiotic and/or increase foods with probiotics such as yogurt, Orange, sauerkraut.    Alternate Tylenol and ibuprofen as needed for pain    Follow-up with primary care provider or return to urgent care/ED with any worsening in condition or additional concerns.

## 2023-10-20 NOTE — ED TRIAGE NOTES
Patient presents to urgent care for right ear pain that started about a week ago. No OTC medications.  
2.02

## 2023-12-30 ENCOUNTER — APPOINTMENT (OUTPATIENT)
Dept: ULTRASOUND IMAGING | Facility: OTHER | Age: 63
End: 2023-12-30
Attending: PHYSICIAN ASSISTANT
Payer: COMMERCIAL

## 2023-12-30 ENCOUNTER — HOSPITAL ENCOUNTER (EMERGENCY)
Facility: OTHER | Age: 63
Discharge: HOME | End: 2023-12-30
Attending: PHYSICIAN ASSISTANT | Admitting: PHYSICIAN ASSISTANT
Payer: COMMERCIAL

## 2023-12-30 VITALS
OXYGEN SATURATION: 97 % | WEIGHT: 220 LBS | BODY MASS INDEX: 40.48 KG/M2 | DIASTOLIC BLOOD PRESSURE: 90 MMHG | HEIGHT: 62 IN | SYSTOLIC BLOOD PRESSURE: 134 MMHG | TEMPERATURE: 98.2 F | HEART RATE: 90 BPM | RESPIRATION RATE: 20 BRPM

## 2023-12-30 DIAGNOSIS — I82.621 ACUTE DEEP VEIN THROMBOSIS (DVT) OF BRACHIAL VEIN OF RIGHT UPPER EXTREMITY (H): ICD-10-CM

## 2023-12-30 DIAGNOSIS — S46.211A TEAR OF RIGHT BICEPS MUSCLE, INITIAL ENCOUNTER: ICD-10-CM

## 2023-12-30 LAB
ALBUMIN SERPL BCG-MCNC: 4.1 G/DL (ref 3.5–5.2)
ALP SERPL-CCNC: 77 U/L (ref 40–150)
ALT SERPL W P-5'-P-CCNC: 39 U/L (ref 0–50)
ANION GAP SERPL CALCULATED.3IONS-SCNC: 12 MMOL/L (ref 7–15)
APTT PPP: 26 SECONDS (ref 22–38)
AST SERPL W P-5'-P-CCNC: 34 U/L (ref 0–45)
BASOPHILS # BLD AUTO: 0.1 10E3/UL (ref 0–0.2)
BASOPHILS NFR BLD AUTO: 1 %
BILIRUB SERPL-MCNC: 0.5 MG/DL
BUN SERPL-MCNC: 17.8 MG/DL (ref 8–23)
CALCIUM SERPL-MCNC: 9.6 MG/DL (ref 8.8–10.2)
CHLORIDE SERPL-SCNC: 104 MMOL/L (ref 98–107)
CREAT SERPL-MCNC: 0.69 MG/DL (ref 0.51–0.95)
DEPRECATED HCO3 PLAS-SCNC: 22 MMOL/L (ref 22–29)
EGFRCR SERPLBLD CKD-EPI 2021: >90 ML/MIN/1.73M2
EOSINOPHIL # BLD AUTO: 0.3 10E3/UL (ref 0–0.7)
EOSINOPHIL NFR BLD AUTO: 3 %
ERYTHROCYTE [DISTWIDTH] IN BLOOD BY AUTOMATED COUNT: 12.5 % (ref 10–15)
GLUCOSE SERPL-MCNC: 110 MG/DL (ref 70–99)
HCT VFR BLD AUTO: 41.4 % (ref 35–47)
HGB BLD-MCNC: 14.2 G/DL (ref 11.7–15.7)
HOLD SPECIMEN: NORMAL
HOLD SPECIMEN: NORMAL
IMM GRANULOCYTES # BLD: 0 10E3/UL
IMM GRANULOCYTES NFR BLD: 0 %
INR PPP: 0.92 (ref 0.85–1.15)
LYMPHOCYTES # BLD AUTO: 2.2 10E3/UL (ref 0.8–5.3)
LYMPHOCYTES NFR BLD AUTO: 22 %
MCH RBC QN AUTO: 29.2 PG (ref 26.5–33)
MCHC RBC AUTO-ENTMCNC: 34.3 G/DL (ref 31.5–36.5)
MCV RBC AUTO: 85 FL (ref 78–100)
MONOCYTES # BLD AUTO: 0.7 10E3/UL (ref 0–1.3)
MONOCYTES NFR BLD AUTO: 7 %
NEUTROPHILS # BLD AUTO: 6.6 10E3/UL (ref 1.6–8.3)
NEUTROPHILS NFR BLD AUTO: 67 %
NRBC # BLD AUTO: 0 10E3/UL
NRBC BLD AUTO-RTO: 0 /100
PLATELET # BLD AUTO: 325 10E3/UL (ref 150–450)
POTASSIUM SERPL-SCNC: 4.1 MMOL/L (ref 3.4–5.3)
PROT SERPL-MCNC: 6.4 G/DL (ref 6.4–8.3)
RBC # BLD AUTO: 4.87 10E6/UL (ref 3.8–5.2)
SODIUM SERPL-SCNC: 138 MMOL/L (ref 135–145)
WBC # BLD AUTO: 9.9 10E3/UL (ref 4–11)

## 2023-12-30 PROCEDURE — 80053 COMPREHEN METABOLIC PANEL: CPT | Performed by: PHYSICIAN ASSISTANT

## 2023-12-30 PROCEDURE — 85025 COMPLETE CBC W/AUTO DIFF WBC: CPT | Performed by: PHYSICIAN ASSISTANT

## 2023-12-30 PROCEDURE — 99284 EMERGENCY DEPT VISIT MOD MDM: CPT | Mod: 25 | Performed by: PHYSICIAN ASSISTANT

## 2023-12-30 PROCEDURE — 85730 THROMBOPLASTIN TIME PARTIAL: CPT | Performed by: PHYSICIAN ASSISTANT

## 2023-12-30 PROCEDURE — 36415 COLL VENOUS BLD VENIPUNCTURE: CPT | Performed by: PHYSICIAN ASSISTANT

## 2023-12-30 PROCEDURE — 99284 EMERGENCY DEPT VISIT MOD MDM: CPT | Performed by: PHYSICIAN ASSISTANT

## 2023-12-30 PROCEDURE — 93971 EXTREMITY STUDY: CPT | Mod: RT

## 2023-12-30 PROCEDURE — 85610 PROTHROMBIN TIME: CPT | Performed by: PHYSICIAN ASSISTANT

## 2023-12-30 PROCEDURE — 250N000013 HC RX MED GY IP 250 OP 250 PS 637: Performed by: PHYSICIAN ASSISTANT

## 2023-12-30 RX ORDER — ESZOPICLONE 1 MG/1
TABLET, FILM COATED ORAL
COMMUNITY
Start: 2023-10-12

## 2023-12-30 RX ORDER — TOLTERODINE 4 MG/1
CAPSULE, EXTENDED RELEASE ORAL
COMMUNITY
Start: 2023-11-12

## 2023-12-30 RX ADMIN — RIVAROXABAN 15 MG: 15 TABLET, FILM COATED ORAL at 20:34

## 2023-12-30 ASSESSMENT — ACTIVITIES OF DAILY LIVING (ADL): ADLS_ACUITY_SCORE: 35

## 2023-12-30 NOTE — ED TRIAGE NOTES
"Pt to ER from home with family with c/o left arm lump, bicep region, that spontaneously appeared yesterday, this AM it was bruised.  The bruising has increased rapidly t/o the day.  Pt hurt right shoulder approx one week ago, so has had right arm immobilized in a sling.  Pt concerned she has a \"blood clot\".  No numbness or tingling present, pulses equal, color of extremity normal.  Pain to area with movement of arm.  Takes Percocet for back pain, last dose 1430.     Triage Assessment (Adult)       Row Name 12/30/23 1601          Triage Assessment    Airway WDL WDL        Respiratory WDL    Respiratory WDL WDL        Skin Circulation/Temperature WDL    Skin Circulation/Temperature WDL WDL        Cardiac WDL    Cardiac WDL WDL        Peripheral/Neurovascular WDL    Peripheral Neurovascular WDL WDL        Cognitive/Neuro/Behavioral WDL    Cognitive/Neuro/Behavioral WDL WDL                     "

## 2023-12-31 NOTE — ED PROVIDER NOTES
"      EMERGENCY DEPARTMENT ENCOUNTER      NAME: Mary Jane Stack  AGE: 63 year old female  YOB: 1960  MRN: [id number]  EVALUATION DATE & TIME: 12/30/2023  5:25 PM    PCP: No Ref-Primary, Physician    ED PROVIDER: Pratik Red PA-C       CHIEF COMPLAINT:  Chief Complaint   Patient presents with    Arm Pain       ED COURSE, MEDICAL DECISION MAKING, FINAL IMPRESSION AND PLAN:     Assessment / Plan:  1. Acute deep vein thrombosis (DVT) of brachial vein of right upper extremity (H)    2. Tear of right biceps muscle, initial encounter        The patient was interviewed and examined.  HPI and physical exam as below.  Differential diagnosis and MDM Key Documentation Elements as below.  Vitals, triage note, and nursing notes were reviewed.  BP (!) 134/90   Pulse 90   Temp 98.2  F (36.8  C) (Tympanic)   Resp 20   Ht 1.575 m (5' 2\")   Wt 99.8 kg (220 lb)   SpO2 97%   BMI 40.24 kg/m      Differential includes but is not limited to biceps tear, contusion, rotator cuff injury, DVT, hematoma    Patient with noted swelling and bruising of the right biceps tendon.  No distal biceps tendon injury.  Concern for possible proximal biceps tendon injury as patient did have some weakness with elbow flexion.  Ultrasound today shows signs of possible bicep tendon rupture as well as DVT in the basilic and brachial veins.    Plan is for patient be started on Xarelto.  Patient will follow-up with orthopedics for possible biceps tendon injury.  Patient declined any pain medications here in the ER.  She will return to the ER for any worsening of symptoms.  Patient denies any chest pain or shortness of breath.    Pertinent Labs & Imaging studies reviewed. (See chart for details)  Results for orders placed or performed during the hospital encounter of 12/30/23   US Upper Extremity Venous Duplex Right    Impression    IMPRESSION: Venous thrombosis in the basilic and brachial veins on the  right. Possible biceps muscle " "rupture. The emergency room was called  at 8:06 PM    EVA GUAJARDO MD         SYSTEM ID:  [id number]   Result Value Ref Range    INR 0.92 0.85 - 1.15   Partial thromboplastin time   Result Value Ref Range    aPTT 26 22 - 38 Seconds   Comprehensive metabolic panel   Result Value Ref Range    Sodium 138 135 - 145 mmol/L    Potassium 4.1 3.4 - 5.3 mmol/L    Carbon Dioxide (CO2) 22 22 - 29 mmol/L    Anion Gap 12 7 - 15 mmol/L    Urea Nitrogen 17.8 8.0 - 23.0 mg/dL    Creatinine 0.69 0.51 - 0.95 mg/dL    GFR Estimate >90 >60 mL/min/1.73m2    Calcium 9.6 8.8 - 10.2 mg/dL    Chloride 104 98 - 107 mmol/L    Glucose 110 (H) 70 - 99 mg/dL    Alkaline Phosphatase 77 40 - 150 U/L    AST 34 0 - 45 U/L    ALT 39 0 - 50 U/L    Protein Total 6.4 6.4 - 8.3 g/dL    Albumin 4.1 3.5 - 5.2 g/dL    Bilirubin Total 0.5 <=1.2 mg/dL   CBC with platelets and differential   Result Value Ref Range    WBC Count 9.9 4.0 - 11.0 10e3/uL    RBC Count 4.87 3.80 - 5.20 10e6/uL    Hemoglobin 14.2 11.7 - 15.7 g/dL    Hematocrit 41.4 35.0 - 47.0 %    MCV 85 78 - 100 fL    MCH 29.2 26.5 - 33.0 pg    MCHC 34.3 31.5 - 36.5 g/dL    RDW 12.5 10.0 - 15.0 %    Platelet Count 325 150 - 450 10e3/uL    % Neutrophils 67 %    % Lymphocytes 22 %    % Monocytes 7 %    % Eosinophils 3 %    % Basophils 1 %    % Immature Granulocytes 0 %    NRBCs per 100 WBC 0 <1 /100    Absolute Neutrophils 6.6 1.6 - 8.3 10e3/uL    Absolute Lymphocytes 2.2 0.8 - 5.3 10e3/uL    Absolute Monocytes 0.7 0.0 - 1.3 10e3/uL    Absolute Eosinophils 0.3 0.0 - 0.7 10e3/uL    Absolute Basophils 0.1 0.0 - 0.2 10e3/uL    Absolute Immature Granulocytes 0.0 <=0.4 10e3/uL    Absolute NRBCs 0.0 10e3/uL   Extra Red Top Tube   Result Value Ref Range    Hold Specimen JIC    Extra Green Top (Lithium Heparin) Tube   Result Value Ref Range    Hold Specimen JIC      No results found for: \"ABORH\"      Reassessments, Medications, Interventions, & Response to Treatments:  Discussed laboratory and imaging " results.  No adverse effects to Xarelto.    Medications given during today's ER visit:  Medications   rivaroxaban ANTICOAGULANT (XARELTO) tablet 15 mg (15 mg Oral $Given 12/30/23 2034)       Consultations:  None    Decision Rules, Medical Calculators, and Risk Stratification Tools:  None    MDM Key Documentation Elements for Patient's Evaluation:  Differential diagnosis to include high risk considerations: As above  Escalation to admission/observation considered: Admission/observation considered, but patient does not meet admission criteria  Discussions and management with other clinicians:    3a. Independent interpretation of testing performed by another health professional:  -No  3b. Discussion of management or test interpretation with another health professional: -No  Independent interpretation of tests:  Ordering and/or review of 3+ test(s)  Discussion of test interpretations with radiology:  No  History obtained from source other than patient or assessment requiring an independent historian:  No  Review of non-ED/external records:  review of 3+ records  Diagnostic tests considered but not ultimately performed/deferred:  -MRI right bicep  Prescription medications considered but not prescribed:  -Opiates  Chronic conditions affecting care:  -None  Care affected by social determinants of health:  -None    The patient's management involved:   - Laboratory studies  - Imaging studies  - Prescription drug management    A shared decision making model was used. Time was taken to answer all questions.  Patient and/or associated parties understood and were agreeable to treatment plan.  Plan and all results were discussed. Warning signs and close return precautions to return to the ED given. Copy of results given. Discharged in stable condition. Discharged with discharge instructions outlining plan for further care and follow up.      PPE worn during patient evaluation:  Mask: Yes  Eye Protection: No  Gown: No  Hair cover:  No  Face Shield: No  Patient wearing a mask: No    New prescriptions started at today's ER visit  Discharge Medication List as of 12/30/2023  8:38 PM        START taking these medications    Details   Rivaroxaban ANTICOAGULANT 15 & 20 MG TBPK Starter Therapy Pack Take 15 mg by mouth 2 times daily (with meals) for 21 days, THEN 20 mg daily with food for 9 days., Disp-51 each, R-0, E-Prescribe             =================================================================    HPI  Mary Jane Stack is a pleasant 63 year old female who presents to the ER today for concerns of possible right upper extremity DVT.  Patient states that on Tuesday she was lifting a box when she felt a pop in her shoulder.  Patient having bruising and swelling throughout her right bicep.  Patient having difficulty with elbow flexion and lifting her right shoulder.  Patient states that she felt something hard underneath her bicep and she is concerned for possible DVT as she does have a family history of DVT.  Patient does take a daily aspirin.  No chest pain or shortness of breath.  No numbness or paresthesias.      REVIEW OF SYSTEMS   Review of Systems  As above, otherwise ROS is unremarkable.      PAST MEDICAL HISTORY:  Past Medical History:   Diagnosis Date    Diabetes mellitus, type 2 (H)     Esophageal reflux     Gall bladder disease     Hypertension     Migraines     Thyroid disease     hypothyroidism    Uncomplicated asthma        PAST SURGICAL HISTORY:  Past Surgical History:   Procedure Laterality Date    APPENDECTOMY      BACK SURGERY  2007    CARPAL TUNNEL RELEASE RT/LT  1999    COLONOSCOPY - HIM SCAN  01/27/2005    Diverticulosis and hemorrhoids, family history, repeat 5 yrs    DECOMPRESS DISC RF LUMBAR      3 levels    DILATION AND CURETTAGE      FUSION LUMBAR ANTERIOR THREE+ LEVELS      GALLBLADDER SURGERY  1987    HERNIA REPAIR  2007    TUBAL LIGATION  1987       CURRENT MEDICATIONS:    Current Outpatient Medications   Medication  Instructions    ACETAMINOPHEN  mg, Oral, 3 TIMES DAILY    albuterol (PROAIR HFA, PROVENTIL HFA, VENTOLIN HFA) 108 (90 BASE) MCG/ACT inhaler 2 puffs, Inhalation, EVERY 4 HOURS PRN    ASPIRIN PO 81 mg, Oral    atorvastatin (LIPITOR) 40 MG tablet TAKE 1 TABLET BY MOUTH AT BEDTIME    blood glucose monitoring (ONE TOUCH DELICA) lancets USE ONE STRIP TO CHECK GLUCOSE THREE TIMES DAILY    celecoxib (CELEBREX) 200 mg, Oral, 2 TIMES DAILY    Eszopiclone (LUNESTA PO) 3 mg, Oral, AT BEDTIME    eszopiclone (LUNESTA) 1 MG tablet TAKE 1 TABLET BY MOUTH AT BEDTIME. TAKE IMMEDIATELY BEFORE BEDTIME; DO NOT CRUSH OR BREAK TABLET.    levothyroxine (SYNTHROID/LEVOTHROID) 50 mcg, Oral, DAILY    lisinopril (PRINIVIL/ZESTRIL) 10 MG tablet TAKE 1 TABLET BY MOUTH ONCE DAILY    metFORMIN (GLUCOPHAGE) 500 mg, Oral, 2 TIMES DAILY WITH MEALS    naloxone (NARCAN) 4 MG/0.1ML nasal spray 1 spray, Nasal    omeprazole (PRILOSEC) 20 MG DR capsule TAKE 1 CAPSULE BY MOUTH TWICE DAILY BEFORE MEALS--DO NOT CRUSH    omeprazole (PRILOSEC) 20 mg, Oral, 2 TIMES DAILY    ONETOUCH ULTRA test strip USE 1 STRIP TO CHECK GLUCOSE TWICE DAILY BEFORE MEAL(S)    OxyCODONE HCl (OXYCONTIN PO) 10 mg, Oral, 3 TIMES DAILY PRN    oxyCODONE-acetaminophen (PERCOCET)  MG per tablet 1 tablet, Oral, EVERY 6 HOURS PRN    Rivaroxaban ANTICOAGULANT 15 & 20 MG TBPK Starter Therapy Pack Take 15 mg by mouth 2 times daily (with meals) for 21 days, THEN 20 mg daily with food for 9 days.    tolterodine ER (DETROL LA) 4 MG 24 hr capsule TAKE 1 CAPSULE BY MOUTH ONCE DAILY DO NOT CRUSH CHEW OR OPEN SWALLOW WHOLE    Tolterodine Tartrate (DETROL LA PO) 4 mg, Oral, DAILY    vitamin  B complex with vitamin C (VITAMIN  B COMPLEX) TABS 1 tablet, Oral, DAILY       ALLERGIES:  Allergies   Allergen Reactions    Vancomycin Shortness Of Breath    Hornet Venom Other (See Comments)     Swelling, red streaks    Penicillin G Rash       FAMILY HISTORY:  Family History   Problem Relation Age of  "Onset    Diabetes Mother     Cancer Mother     Migraines Mother     Obesity Mother     Diabetes Maternal Grandfather     Hypertension Father     Heart Disease Father        SOCIAL HISTORY:   Social History     Socioeconomic History    Marital status:    Tobacco Use    Smoking status: Former     Types: Cigarettes    Smokeless tobacco: Never   Substance and Sexual Activity    Alcohol use: No    Drug use: No    Sexual activity: Not Currently       PHYSICAL EXAM    VITAL SIGNS: BP (!) 134/90   Pulse 90   Temp 98.2  F (36.8  C) (Tympanic)   Resp 20   Ht 1.575 m (5' 2\")   Wt 99.8 kg (220 lb)   SpO2 97%   BMI 40.24 kg/m      Patient Vitals for the past 24 hrs:   BP Temp Temp src Pulse Resp SpO2 Height Weight   12/30/23 1605 (!) 134/90 98.2  F (36.8  C) Tympanic 90 20 97 % 1.575 m (5' 2\") 99.8 kg (220 lb)       Physical Exam  Vitals and nursing note reviewed.   Constitutional:       Appearance: Normal appearance.   HENT:      Nose: Nose normal.      Mouth/Throat:      Mouth: Mucous membranes are moist.      Pharynx: Oropharynx is clear.   Eyes:      Conjunctiva/sclera: Conjunctivae normal.   Cardiovascular:      Rate and Rhythm: Normal rate and regular rhythm.      Pulses: Normal pulses.   Pulmonary:      Effort: Pulmonary effort is normal.      Breath sounds: Normal breath sounds.   Musculoskeletal:      Cervical back: Normal range of motion.      Comments: Evaluation right upper extremity reveals marked swelling, bruising, and tenderness of the right biceps tendon.  Distal bicep tendon was intact.  Questionable rupture of the proximal right biceps.  No right elbow tenderness.  Mild right shoulder tenderness.  Patient able to extend her arm fully above her head with pain.  NVI   Skin:     General: Skin is warm.      Capillary Refill: Capillary refill takes less than 2 seconds.   Neurological:      General: No focal deficit present.      Mental Status: She is alert.   Psychiatric:         Mood and Affect: " Mood normal.             LABS & RADIOLOGY:  All pertinent labs reviewed and interpreted. Reviewed all pertinent imaging. Please see official radiology report.  Results for orders placed or performed during the hospital encounter of 12/30/23   US Upper Extremity Venous Duplex Right    Impression    IMPRESSION: Venous thrombosis in the basilic and brachial veins on the  right. Possible biceps muscle rupture. The emergency room was called  at 8:06 PM    EVA GUAJARDO MD         SYSTEM ID:  [id number]   Result Value Ref Range    INR 0.92 0.85 - 1.15   Partial thromboplastin time   Result Value Ref Range    aPTT 26 22 - 38 Seconds   Comprehensive metabolic panel   Result Value Ref Range    Sodium 138 135 - 145 mmol/L    Potassium 4.1 3.4 - 5.3 mmol/L    Carbon Dioxide (CO2) 22 22 - 29 mmol/L    Anion Gap 12 7 - 15 mmol/L    Urea Nitrogen 17.8 8.0 - 23.0 mg/dL    Creatinine 0.69 0.51 - 0.95 mg/dL    GFR Estimate >90 >60 mL/min/1.73m2    Calcium 9.6 8.8 - 10.2 mg/dL    Chloride 104 98 - 107 mmol/L    Glucose 110 (H) 70 - 99 mg/dL    Alkaline Phosphatase 77 40 - 150 U/L    AST 34 0 - 45 U/L    ALT 39 0 - 50 U/L    Protein Total 6.4 6.4 - 8.3 g/dL    Albumin 4.1 3.5 - 5.2 g/dL    Bilirubin Total 0.5 <=1.2 mg/dL   CBC with platelets and differential   Result Value Ref Range    WBC Count 9.9 4.0 - 11.0 10e3/uL    RBC Count 4.87 3.80 - 5.20 10e6/uL    Hemoglobin 14.2 11.7 - 15.7 g/dL    Hematocrit 41.4 35.0 - 47.0 %    MCV 85 78 - 100 fL    MCH 29.2 26.5 - 33.0 pg    MCHC 34.3 31.5 - 36.5 g/dL    RDW 12.5 10.0 - 15.0 %    Platelet Count 325 150 - 450 10e3/uL    % Neutrophils 67 %    % Lymphocytes 22 %    % Monocytes 7 %    % Eosinophils 3 %    % Basophils 1 %    % Immature Granulocytes 0 %    NRBCs per 100 WBC 0 <1 /100    Absolute Neutrophils 6.6 1.6 - 8.3 10e3/uL    Absolute Lymphocytes 2.2 0.8 - 5.3 10e3/uL    Absolute Monocytes 0.7 0.0 - 1.3 10e3/uL    Absolute Eosinophils 0.3 0.0 - 0.7 10e3/uL    Absolute Basophils 0.1  0.0 - 0.2 10e3/uL    Absolute Immature Granulocytes 0.0 <=0.4 10e3/uL    Absolute NRBCs 0.0 10e3/uL   Extra Red Top Tube   Result Value Ref Range    Hold Specimen JIC    Extra Green Top (Lithium Heparin) Tube   Result Value Ref Range    Hold Specimen JIC      US Upper Extremity Venous Duplex Right   Final Result   IMPRESSION: Venous thrombosis in the basilic and brachial veins on the   right. Possible biceps muscle rupture. The emergency room was called   at 8:06 PM      EVA GUAJARDO MD            SYSTEM ID:  [id number]                I, Last Red PA-C, personally performed the services described in this documentation, and it is both accurate and complete.       Pratik Red PA-C  12/30/23 [id number]

## 2023-12-31 NOTE — DISCHARGE INSTRUCTIONS
-Start Xarelto for blood thinner.  Take 15 mg every 12 hours for 21 days.  After 21 days, take Xarelto 20 mg once daily at the same time.  Please do not use aspirin or Celebrex while on Xarelto.  Please follow-up with your primary care doctor for long-term management of his Xarelto.  -You are now on a blood thinner.  If you do have any major head injuries, please present to the ER for immediate valuation and CT imaging of the head.  If you have any significant bleeding or abnormal bruising, please present to the ER for further evaluation  -You may use sling as needed for comfort.  He also have a suspected right biceps tear.  Would recommend close follow-up with orthopedics for further evaluation.

## 2024-09-18 ENCOUNTER — HOSPITAL ENCOUNTER (EMERGENCY)
Facility: HOSPITAL | Age: 64
Discharge: HOME | End: 2024-09-18
Payer: COMMERCIAL

## 2024-09-18 VITALS
OXYGEN SATURATION: 96 % | TEMPERATURE: 97.6 F | SYSTOLIC BLOOD PRESSURE: 117 MMHG | DIASTOLIC BLOOD PRESSURE: 81 MMHG | BODY MASS INDEX: 38.76 KG/M2 | HEART RATE: 74 BPM | HEIGHT: 62 IN | RESPIRATION RATE: 16 BRPM | WEIGHT: 210.6 LBS

## 2024-09-18 DIAGNOSIS — N39.0 URINARY TRACT INFECTION WITH HEMATURIA, SITE UNSPECIFIED: ICD-10-CM

## 2024-09-18 DIAGNOSIS — R31.9 URINARY TRACT INFECTION WITH HEMATURIA, SITE UNSPECIFIED: ICD-10-CM

## 2024-09-18 LAB
ALBUMIN UR-MCNC: ABNORMAL G/DL
APPEARANCE UR: ABNORMAL
BILIRUB UR QL STRIP: ABNORMAL
COLOR UR AUTO: ABNORMAL
GLUCOSE UR STRIP-MCNC: ABNORMAL MG/DL
HGB UR QL STRIP: ABNORMAL
KETONES UR STRIP-MCNC: ABNORMAL MG/DL
LEUKOCYTE ESTERASE UR QL STRIP: ABNORMAL
MUCOUS THREADS #/AREA URNS LPF: PRESENT /LPF
NITRATE UR QL: ABNORMAL
PH UR STRIP: ABNORMAL [PH]
RBC URINE: >182 /HPF
SP GR UR STRIP: ABNORMAL
SQUAMOUS EPITHELIAL: 9 /HPF
UROBILINOGEN UR STRIP-MCNC: ABNORMAL MG/DL
WBC CLUMPS #/AREA URNS HPF: PRESENT /HPF
WBC URINE: >182 /HPF
YEAST #/AREA URNS HPF: ABNORMAL /HPF

## 2024-09-18 PROCEDURE — 87186 SC STD MICRODIL/AGAR DIL: CPT

## 2024-09-18 PROCEDURE — 81001 URINALYSIS AUTO W/SCOPE: CPT

## 2024-09-18 PROCEDURE — 99213 OFFICE O/P EST LOW 20 MIN: CPT

## 2024-09-18 PROCEDURE — G0463 HOSPITAL OUTPT CLINIC VISIT: HCPCS

## 2024-09-18 RX ORDER — RIVAROXABAN 20 MG/1
TABLET, FILM COATED ORAL
COMMUNITY
Start: 2024-01-25

## 2024-09-18 RX ORDER — SULFAMETHOXAZOLE/TRIMETHOPRIM 800-160 MG
1 TABLET ORAL 2 TIMES DAILY
Qty: 14 TABLET | Refills: 0 | Status: SHIPPED | OUTPATIENT
Start: 2024-09-18 | End: 2024-09-25

## 2024-09-18 RX ORDER — PHENAZOPYRIDINE HYDROCHLORIDE 100 MG/1
100 TABLET, FILM COATED ORAL 3 TIMES DAILY PRN
Qty: 12 TABLET | Refills: 0 | Status: SHIPPED | OUTPATIENT
Start: 2024-09-18

## 2024-09-18 ASSESSMENT — ACTIVITIES OF DAILY LIVING (ADL): ADLS_ACUITY_SCORE: 35

## 2024-09-18 ASSESSMENT — ENCOUNTER SYMPTOMS
ACTIVITY CHANGE: 0
NAUSEA: 0
VOMITING: 0
HEMATURIA: 1
ABDOMINAL PAIN: 0
DYSURIA: 1
FEVER: 0
CHILLS: 0
FLANK PAIN: 0
APPETITE CHANGE: 0
DIARRHEA: 0
BACK PAIN: 1
FREQUENCY: 1

## 2024-09-18 NOTE — DISCHARGE INSTRUCTIONS
Bactrim 2 times daily for 7 days. Yogurt or probiotic while taking.   Push fluids.   Tylenol and ibuprofen as directed.   Return with any new or concerning symptoms.   Follow up in the clinic as needed.

## 2024-09-18 NOTE — ED PROVIDER NOTES
History     Chief Complaint   Patient presents with    Urinary Frequency     HPI  Mary Jane Stack is a 64 year old female who presents to the urgent care with complaints of dysuria, urinary frequency, and hematuria for the last 2 days. She denies vaginal discharge, fevers, chills, abd pain, n/v/d, and change in back pain from chronic back pain. Denies concern for STIs. Denies hx of kidney stones or pyelo. No recent abx or OTC medications.     Allergies:  Allergies   Allergen Reactions    Vancomycin Shortness Of Breath    Hornet Venom Other (See Comments)     Swelling, red streaks    Penicillin G Rash       Problem List:    Patient Active Problem List    Diagnosis Date Noted    Diabetes mellitus, type 2 (H)      Priority: Medium    Esophageal reflux      Priority: Medium    Hypertension      Priority: Medium    Gastroesophageal reflux disease 09/06/2019     Priority: Medium     Overview:   IMO Update 10/11      ACP (advance care planning) 09/20/2016     Priority: Medium     Advance Care Planning 9/20/2016: ACP Review of Chart / Resources Provided:  Reviewed chart for advance care plan.  Mary Jane Stack has no plan or code status on file. Discussed available resources and provided with information. Confirmed code status reflects current choices pending further ACP discussions.  Confirmed/documented legally designated decision makers.  Added by Alyssa Brewster          Type 2 diabetes mellitus (H) 08/23/2016     Priority: Medium    Opioid dependence (H) 03/25/2016     Priority: Medium    Hyperlipidemia 04/01/2015     Priority: Medium    Morbid (severe) obesity due to excess calories (H) 04/10/2012     Priority: Medium    Long term current use of non-steroidal anti-inflammatories (NSAID) 03/20/2012     Priority: Medium    Actinic keratosis 03/06/2012     Priority: Medium    Atypical nevus 03/06/2012     Priority: Medium     Overview:   IMO Update 10/11  IMO Update      Hypothyroidism 07/25/2011     Priority: Medium      Overview:   IMO Update 10/11      Impaired fasting glucose 01/20/2011     Priority: Medium     Overview:   IMO Update 10/11      Insomnia 12/06/2006     Priority: Medium    Degeneration of intervertebral disc of lumbosacral region 10/10/2006     Priority: Medium     Overview:   IMO Update 10/11      Excessive and frequent menstruation 10/10/2006     Priority: Medium     Overview:   IMO Update 10/11      Low back pain 10/10/2006     Priority: Medium     Overview:   IMO Update 10/11      Psoriasis 10/10/2006     Priority: Medium    Essential hypertension 09/12/2006     Priority: Medium     Overview:   IMO Update          Past Medical History:    Past Medical History:   Diagnosis Date    Diabetes mellitus, type 2 (H)     Esophageal reflux     Gall bladder disease     Hypertension     Migraines     Thyroid disease     Uncomplicated asthma        Past Surgical History:    Past Surgical History:   Procedure Laterality Date    APPENDECTOMY      BACK SURGERY  2007    CARPAL TUNNEL RELEASE RT/LT  1999    COLONOSCOPY - HIM SCAN  01/27/2005    Diverticulosis and hemorrhoids, family history, repeat 5 yrs    DECOMPRESS DISC RF LUMBAR      3 levels    DILATION AND CURETTAGE      FUSION LUMBAR ANTERIOR THREE+ LEVELS      GALLBLADDER SURGERY  1987    HERNIA REPAIR  2007    TUBAL LIGATION  1987       Family History:    Family History   Problem Relation Age of Onset    Diabetes Mother     Cancer Mother     Migraines Mother     Obesity Mother     Diabetes Maternal Grandfather     Hypertension Father     Heart Disease Father        Social History:  Marital Status:   [4]  Social History     Tobacco Use    Smoking status: Former     Types: Cigarettes    Smokeless tobacco: Never   Substance Use Topics    Alcohol use: No    Drug use: No        Medications:    ACETAMINOPHEN PO  albuterol (PROAIR HFA, PROVENTIL HFA, VENTOLIN HFA) 108 (90 BASE) MCG/ACT inhaler  ASPIRIN PO  atorvastatin (LIPITOR) 40 MG tablet  blood glucose monitoring  "(ONE TOUCH DELICA) lancets  celecoxib (CELEBREX) 200 MG capsule  Eszopiclone (LUNESTA PO)  eszopiclone (LUNESTA) 1 MG tablet  levothyroxine (SYNTHROID/LEVOTHROID) 50 MCG tablet  lisinopril (PRINIVIL/ZESTRIL) 10 MG tablet  metFORMIN (GLUCOPHAGE) 500 MG tablet  naloxone (NARCAN) 4 MG/0.1ML nasal spray  omeprazole (PRILOSEC) 2 mg/mL  omeprazole (PRILOSEC) 20 MG DR capsule  ONETOUCH ULTRA test strip  OxyCODONE HCl (OXYCONTIN PO)  oxyCODONE-acetaminophen (PERCOCET)  MG per tablet  phenazopyridine (PYRIDIUM) 100 MG tablet  sulfamethoxazole-trimethoprim (BACTRIM DS) 800-160 MG tablet  tolterodine ER (DETROL LA) 4 MG 24 hr capsule  Tolterodine Tartrate (DETROL LA PO)  vitamin  B complex with vitamin C (VITAMIN  B COMPLEX) TABS  XARELTO ANTICOAGULANT 20 MG TABS tablet  Rivaroxaban ANTICOAGULANT 15 & 20 MG TBPK Starter Therapy Pack          Review of Systems   Constitutional:  Negative for activity change, appetite change, chills and fever.   Gastrointestinal:  Negative for abdominal pain, diarrhea, nausea and vomiting.   Genitourinary:  Positive for dysuria, frequency and hematuria. Negative for flank pain and vaginal discharge.   Musculoskeletal:  Positive for back pain (chronic, unchanged).   All other systems reviewed and are negative.      Physical Exam   BP: 117/81  Pulse: 74  Temp: 97.6  F (36.4  C)  Resp: 16  Height: 157.5 cm (5' 2\")  Weight: 95.5 kg (210 lb 9.6 oz)  SpO2: 96 %      Physical Exam  Vitals and nursing note reviewed.   Constitutional:       General: She is not in acute distress.     Appearance: Normal appearance. She is not ill-appearing or toxic-appearing.   Cardiovascular:      Rate and Rhythm: Normal rate and regular rhythm.      Heart sounds: Normal heart sounds. No murmur heard.  Pulmonary:      Effort: Pulmonary effort is normal.      Breath sounds: Normal breath sounds. No wheezing, rhonchi or rales.   Abdominal:      General: Abdomen is flat.      Palpations: Abdomen is soft.      " Tenderness: There is no abdominal tenderness. There is no right CVA tenderness or left CVA tenderness.   Neurological:      Mental Status: She is alert.         ED Course        Procedures    Results for orders placed or performed during the hospital encounter of 09/18/24 (from the past 24 hour(s))   UA with Microscopic reflex to Culture    Specimen: Urine, Midstream   Result Value Ref Range    Color Urine [place] (A) Colorless, Straw, Light Yellow, Yellow    Appearance Urine Turbid (A) Clear    Glucose Urine      Bilirubin Urine      Ketones Urine      Specific Gravity Urine      Blood Urine      pH Urine      Protein Albumin Urine      Urobilinogen Urine      Nitrite Urine      Leukocyte Esterase Urine      WBC Clumps Urine Present (A) None Seen /HPF    Budding Yeast Urine Many (A) None Seen /HPF    Mucus Urine Present (A) None Seen /LPF    RBC Urine >182 (H) <=2 /HPF    WBC Urine >182 (H) <=5 /HPF    Squamous Epithelials Urine 9 (H) <=1 /HPF    Narrative    Urine Culture ordered based on laboratory criteria       Medications - No data to display    Assessments & Plan (with Medical Decision Making)     I have reviewed the nursing notes.    I have reviewed the findings, diagnosis, plan and need for follow up with the patient.  Mary Jane tSack is a 64 year old female who presents to the urgent care with complaints of dysuria, urinary frequency, and hematuria for the last 2 days. She denies vaginal discharge, fevers, chills, abd pain, n/v/d, and change in back pain from chronic back pain. Denies concern for STIs. Denies hx of kidney stones or pyelo. No recent abx or OTC medications.     MDM: vital signs normal, afebrile. Non toxic in appearance with no noted distress. Lungs clear, heart tones regular. Abd soft, no CVA tenderness. UA positive for infection. Culture pending. Bactrim and pyridium prescribed. Supportive measures and strict return precautions discussed. She is in agreement with plan.     (N39.0,  R31.9)  Urinary tract infection with hematuria, site unspecified  Plan: Bactrim 2 times daily for 7 days. Yogurt or probiotic while taking.   Push fluids.   Tylenol and ibuprofen as directed.   Return with any new or concerning symptoms.   Follow up in the clinic as needed. Understanding verbalized.       Discharge Medication List as of 9/18/2024  5:26 PM        START taking these medications    Details   sulfamethoxazole-trimethoprim (BACTRIM DS) 800-160 MG tablet Take 1 tablet by mouth 2 times daily for 7 days., Disp-14 tablet, R-0, E-Prescribe             Final diagnoses:   Urinary tract infection with hematuria, site unspecified       9/18/2024   [facility]       Jennie Dillon NP  09/18/24 [id number]

## 2024-09-18 NOTE — ED TRIAGE NOTES
Pt presents  with c/o urinary frequency and dysuria. Also reports some hematuria. Sx started yesterday. Denies vaginal discharge or concerns for STDs. No otc meds.

## 2024-09-20 LAB — BACTERIA UR CULT: ABNORMAL

## 2024-10-02 ENCOUNTER — MEDICAL CORRESPONDENCE (OUTPATIENT)
Dept: MRI IMAGING | Facility: HOSPITAL | Age: 64
End: 2024-10-02

## 2024-10-18 ENCOUNTER — HOSPITAL ENCOUNTER (OUTPATIENT)
Dept: MRI IMAGING | Facility: HOSPITAL | Age: 64
Discharge: HOME | End: 2024-10-18
Attending: FAMILY MEDICINE | Admitting: FAMILY MEDICINE
Payer: COMMERCIAL

## 2024-10-18 DIAGNOSIS — M96.1 FAILED BACK SYNDROME OF LUMBAR SPINE: ICD-10-CM

## 2024-10-18 DIAGNOSIS — M51.369 DDD (DEGENERATIVE DISC DISEASE), LUMBAR: ICD-10-CM

## 2024-10-18 DIAGNOSIS — M48.061 SPINAL STENOSIS OF LUMBAR REGION WITHOUT NEUROGENIC CLAUDICATION: ICD-10-CM

## 2024-10-18 DIAGNOSIS — Z98.1 HISTORY OF LUMBAR FUSION: ICD-10-CM

## 2024-10-18 PROCEDURE — 72148 MRI LUMBAR SPINE W/O DYE: CPT

## 2024-10-26 ENCOUNTER — HOSPITAL ENCOUNTER (EMERGENCY)
Facility: HOSPITAL | Age: 64
Discharge: HOME | End: 2024-10-26
Attending: PHYSICIAN ASSISTANT | Admitting: PHYSICIAN ASSISTANT
Payer: COMMERCIAL

## 2024-10-26 VITALS
DIASTOLIC BLOOD PRESSURE: 87 MMHG | TEMPERATURE: 97.5 F | SYSTOLIC BLOOD PRESSURE: 120 MMHG | OXYGEN SATURATION: 95 % | HEART RATE: 90 BPM | RESPIRATION RATE: 20 BRPM

## 2024-10-26 DIAGNOSIS — R31.9 URINARY TRACT INFECTION WITH HEMATURIA, SITE UNSPECIFIED: ICD-10-CM

## 2024-10-26 DIAGNOSIS — B37.9 YEAST DETECTED: ICD-10-CM

## 2024-10-26 DIAGNOSIS — N39.0 URINARY TRACT INFECTION WITH HEMATURIA, SITE UNSPECIFIED: ICD-10-CM

## 2024-10-26 LAB
ALBUMIN UR-MCNC: 200 MG/DL
APPEARANCE UR: ABNORMAL
BACTERIA #/AREA URNS HPF: ABNORMAL /HPF
BILIRUB UR QL STRIP: NEGATIVE
COLOR UR AUTO: ABNORMAL
GLUCOSE UR STRIP-MCNC: NEGATIVE MG/DL
HGB UR QL STRIP: ABNORMAL
KETONES UR STRIP-MCNC: ABNORMAL MG/DL
LEUKOCYTE ESTERASE UR QL STRIP: ABNORMAL
MUCOUS THREADS #/AREA URNS LPF: PRESENT /LPF
NITRATE UR QL: NEGATIVE
PH UR STRIP: 5.5 [PH] (ref 4.7–8)
RBC URINE: >182 /HPF
SP GR UR STRIP: 1.03 (ref 1–1.03)
SQUAMOUS EPITHELIAL: 4 /HPF
UROBILINOGEN UR STRIP-MCNC: NORMAL MG/DL
WBC CLUMPS #/AREA URNS HPF: PRESENT /HPF
WBC URINE: >182 /HPF
YEAST #/AREA URNS HPF: ABNORMAL /HPF

## 2024-10-26 PROCEDURE — 81001 URINALYSIS AUTO W/SCOPE: CPT | Performed by: PHYSICIAN ASSISTANT

## 2024-10-26 PROCEDURE — 87086 URINE CULTURE/COLONY COUNT: CPT | Performed by: PHYSICIAN ASSISTANT

## 2024-10-26 PROCEDURE — 99213 OFFICE O/P EST LOW 20 MIN: CPT | Performed by: PHYSICIAN ASSISTANT

## 2024-10-26 PROCEDURE — G0463 HOSPITAL OUTPT CLINIC VISIT: HCPCS

## 2024-10-26 RX ORDER — FLUCONAZOLE 150 MG/1
TABLET ORAL
Qty: 2 TABLET | Refills: 0 | Status: SHIPPED | OUTPATIENT
Start: 2024-10-26 | End: 2024-10-29

## 2024-10-26 RX ORDER — PHENAZOPYRIDINE HYDROCHLORIDE 200 MG/1
200 TABLET, FILM COATED ORAL 3 TIMES DAILY PRN
Qty: 9 TABLET | Refills: 0 | Status: SHIPPED | OUTPATIENT
Start: 2024-10-26 | End: 2024-10-29

## 2024-10-26 RX ORDER — SULFAMETHOXAZOLE AND TRIMETHOPRIM 800; 160 MG/1; MG/1
1 TABLET ORAL 2 TIMES DAILY
Qty: 10 TABLET | Refills: 0 | Status: SHIPPED | OUTPATIENT
Start: 2024-10-26 | End: 2024-10-31

## 2024-10-26 ASSESSMENT — ACTIVITIES OF DAILY LIVING (ADL)
ADLS_ACUITY_SCORE: 0
ADLS_ACUITY_SCORE: 0

## 2024-10-26 ASSESSMENT — ENCOUNTER SYMPTOMS
FEVER: 0
HEMATURIA: 1
DYSURIA: 1
RESPIRATORY NEGATIVE: 1
CARDIOVASCULAR NEGATIVE: 1
FREQUENCY: 1

## 2024-10-26 ASSESSMENT — COLUMBIA-SUICIDE SEVERITY RATING SCALE - C-SSRS
2. HAVE YOU ACTUALLY HAD ANY THOUGHTS OF KILLING YOURSELF IN THE PAST MONTH?: NO
1. IN THE PAST MONTH, HAVE YOU WISHED YOU WERE DEAD OR WISHED YOU COULD GO TO SLEEP AND NOT WAKE UP?: NO
6. HAVE YOU EVER DONE ANYTHING, STARTED TO DO ANYTHING, OR PREPARED TO DO ANYTHING TO END YOUR LIFE?: NO

## 2024-10-26 NOTE — ED TRIAGE NOTES
EVE Carrillo assessed patient in triage and determined patient Urgent Care appropriate. Will be seen in Urgent Care.       UA cup with wipes provided.

## 2024-10-26 NOTE — ED PROVIDER NOTES
History     Chief Complaint   Patient presents with    Dysuria     HPI  Mary Jane Stack is a 64 year old female who presents with dysuria, urgency, frequency starting over the past 24 hrs. She notes blood in urine this AM. She does take daily ASA, but is off of Xarelto (Hx clot in arm). No fevers, no abdominal pain.     Allergies:  Allergies   Allergen Reactions    Vancomycin Shortness Of Breath    Hornet Venom Other (See Comments)     Swelling, red streaks    Penicillin G Rash       Problem List:    Patient Active Problem List    Diagnosis Date Noted    Diabetes mellitus, type 2 (H)      Priority: Medium    Esophageal reflux      Priority: Medium    Hypertension      Priority: Medium    Gastroesophageal reflux disease 09/06/2019     Priority: Medium     Overview:   IMO Update 10/11      ACP (advance care planning) 09/20/2016     Priority: Medium     Advance Care Planning 9/20/2016: ACP Review of Chart / Resources Provided:  Reviewed chart for advance care plan.  Mary Jane Stack has no plan or code status on file. Discussed available resources and provided with information. Confirmed code status reflects current choices pending further ACP discussions.  Confirmed/documented legally designated decision makers.  Added by Alyssa Brewster          Type 2 diabetes mellitus (H) 08/23/2016     Priority: Medium    Opioid dependence (H) 03/25/2016     Priority: Medium    Hyperlipidemia 04/01/2015     Priority: Medium    Morbid (severe) obesity due to excess calories (H) 04/10/2012     Priority: Medium    Long term current use of non-steroidal anti-inflammatories (NSAID) 03/20/2012     Priority: Medium    Actinic keratosis 03/06/2012     Priority: Medium    Atypical nevus 03/06/2012     Priority: Medium     Overview:   IMO Update 10/11  IMO Update      Hypothyroidism 07/25/2011     Priority: Medium     Overview:   IMO Update 10/11      Impaired fasting glucose 01/20/2011     Priority: Medium     Overview:   IMO Update 10/11       Insomnia 12/06/2006     Priority: Medium    Degeneration of intervertebral disc of lumbosacral region 10/10/2006     Priority: Medium     Overview:   IMO Update 10/11      Excessive and frequent menstruation 10/10/2006     Priority: Medium     Overview:   IMO Update 10/11      Low back pain 10/10/2006     Priority: Medium     Overview:   IMO Update 10/11      Psoriasis 10/10/2006     Priority: Medium    Essential hypertension 09/12/2006     Priority: Medium     Overview:   IMO Update          Past Medical History:    Past Medical History:   Diagnosis Date    Diabetes mellitus, type 2 (H)     Esophageal reflux     Gall bladder disease     Hypertension     Migraines     Thyroid disease     Uncomplicated asthma        Past Surgical History:    Past Surgical History:   Procedure Laterality Date    APPENDECTOMY      BACK SURGERY  2007    CARPAL TUNNEL RELEASE RT/LT  1999    COLONOSCOPY - HIM SCAN  01/27/2005    Diverticulosis and hemorrhoids, family history, repeat 5 yrs    DECOMPRESS DISC RF LUMBAR      3 levels    DILATION AND CURETTAGE      FUSION LUMBAR ANTERIOR THREE+ LEVELS      GALLBLADDER SURGERY  1987    HERNIA REPAIR  2007    TUBAL LIGATION  1987       Family History:    Family History   Problem Relation Age of Onset    Diabetes Mother     Cancer Mother     Migraines Mother     Obesity Mother     Diabetes Maternal Grandfather     Hypertension Father     Heart Disease Father        Social History:  Marital Status:   [4]  Social History     Tobacco Use    Smoking status: Former     Types: Cigarettes    Smokeless tobacco: Never   Substance Use Topics    Alcohol use: No    Drug use: No        Medications:    fluconazole (DIFLUCAN) 150 MG tablet  phenazopyridine (PYRIDIUM) 200 MG tablet  sulfamethoxazole-trimethoprim (BACTRIM DS) 800-160 MG tablet  ACETAMINOPHEN PO  albuterol (PROAIR HFA, PROVENTIL HFA, VENTOLIN HFA) 108 (90 BASE) MCG/ACT inhaler  ASPIRIN PO  atorvastatin (LIPITOR) 40 MG tablet  blood  glucose monitoring (ONE TOUCH DELICA) lancets  celecoxib (CELEBREX) 200 MG capsule  Eszopiclone (LUNESTA PO)  eszopiclone (LUNESTA) 1 MG tablet  levothyroxine (SYNTHROID/LEVOTHROID) 50 MCG tablet  lisinopril (PRINIVIL/ZESTRIL) 10 MG tablet  metFORMIN (GLUCOPHAGE) 500 MG tablet  naloxone (NARCAN) 4 MG/0.1ML nasal spray  omeprazole (PRILOSEC) 2 mg/mL  omeprazole (PRILOSEC) 20 MG DR capsule  ONETOUCH ULTRA test strip  OxyCODONE HCl (OXYCONTIN PO)  oxyCODONE-acetaminophen (PERCOCET)  MG per tablet  Rivaroxaban ANTICOAGULANT 15 & 20 MG TBPK Starter Therapy Pack  tolterodine ER (DETROL LA) 4 MG 24 hr capsule  Tolterodine Tartrate (DETROL LA PO)  vitamin  B complex with vitamin C (VITAMIN  B COMPLEX) TABS  XARELTO ANTICOAGULANT 20 MG TABS tablet          Review of Systems   Constitutional:  Negative for fever.   Respiratory: Negative.     Cardiovascular: Negative.    Genitourinary:  Positive for dysuria, frequency, hematuria and urgency. Negative for vaginal discharge.       Physical Exam   BP: 120/87  Pulse: 90  Temp: 97.5  F (36.4  C)  Resp: 20  SpO2: 95 %      Physical Exam  Vitals and nursing note reviewed.   Constitutional:       General: She is not in acute distress.     Appearance: She is not toxic-appearing.   Cardiovascular:      Rate and Rhythm: Normal rate.   Pulmonary:      Effort: Pulmonary effort is normal.   Abdominal:      Tenderness: There is no right CVA tenderness or left CVA tenderness.   Skin:     General: Skin is warm and dry.   Neurological:      Mental Status: She is alert and oriented to person, place, and time.         ED Course     Results for orders placed or performed during the hospital encounter of 10/26/24 (from the past 24 hours)   UA Macroscopic with reflex to Microscopic and Culture    Specimen: Urine, Clean Catch   Result Value Ref Range    Color Urine Dark Yellow (A) Colorless, Straw, Light Yellow, Yellow    Appearance Urine Cloudy (A) Clear    Glucose Urine Negative Negative  mg/dL    Bilirubin Urine Negative Negative    Ketones Urine Trace (A) Negative mg/dL    Specific Gravity Urine 1.030 1.003 - 1.035    Blood Urine Large (A) Negative    pH Urine 5.5 4.7 - 8.0    Protein Albumin Urine 200 (A) Negative mg/dL    Urobilinogen Urine Normal Normal, 2.0 mg/dL    Nitrite Urine Negative Negative    Leukocyte Esterase Urine Large (A) Negative    Bacteria Urine Many (A) None Seen /HPF    WBC Clumps Urine Present (A) None Seen /HPF    Budding Yeast Urine Many (A) None Seen /HPF    Mucus Urine Present (A) None Seen /LPF    RBC Urine >182 (H) <=2 /HPF    WBC Urine >182 (H) <=5 /HPF    Squamous Epithelials Urine 4 (H) <=1 /HPF    Narrative    Urine Culture ordered based on laboratory criteria       Medications - No data to display    Assessments & Plan (with Medical Decision Making)     I have reviewed the nursing notes.  I have reviewed the findings, diagnosis, plan and need for follow up with the patient.  Discharge Medication List as of 10/26/2024 12:12 PM        START taking these medications    Details   sulfamethoxazole-trimethoprim (BACTRIM DS) 800-160 MG tablet Take 1 tablet by mouth 2 times daily for 5 days., Disp-10 tablet, R-0, E-Prescribe             Final diagnoses:   Urinary tract infection with hematuria, site unspecified   Yeast detected   Bactim and pyridium as above. Diflucan now and repeat 1 week. Continue to hydrate. Although most recent episode hematuria was associated with infection, I recommended F/U with PCP to ensure Sx/blood have cleared. Will seek care here/ER with any worsening despite Tx. Written instructions in AVS for patient reference.     10/26/2024   [facility]       Randolph Coelho PA  10/26/24 [id number]

## 2024-10-26 NOTE — DISCHARGE INSTRUCTIONS
Bactrim for 5 days to cover bladder infection.   Pyridium - OTC is 95mg, you may take 2 (up to 200mg) 3x daily for 2-3 days for pain/burning.   We call if any change in antibiotics is needed, but if you feel that this is not working after 2-3 doses, check mychart or call to see what the culture is up to.   Take one tablet of diflucan now, and one in 7 days.   ANY fevers, severe pain, pelvic/groin pain needs to be rechecked.   Ideally, you recheck urine in 3-5 weeks to make sure there is no blood in the urine.

## 2024-10-27 LAB — BACTERIA UR CULT: NORMAL

## 2024-11-23 ENCOUNTER — HEALTH MAINTENANCE LETTER (OUTPATIENT)
Age: 64
End: 2024-11-23

## 2025-03-09 ENCOUNTER — HEALTH MAINTENANCE LETTER (OUTPATIENT)
Age: 65
End: 2025-03-09

## 2025-06-22 ENCOUNTER — HEALTH MAINTENANCE LETTER (OUTPATIENT)
Age: 65
End: 2025-06-22